# Patient Record
Sex: MALE | Race: WHITE | NOT HISPANIC OR LATINO
[De-identification: names, ages, dates, MRNs, and addresses within clinical notes are randomized per-mention and may not be internally consistent; named-entity substitution may affect disease eponyms.]

---

## 2018-07-29 PROBLEM — Z00.00 ENCOUNTER FOR PREVENTIVE HEALTH EXAMINATION: Status: ACTIVE | Noted: 2018-07-29

## 2018-08-09 ENCOUNTER — RECORD ABSTRACTING (OUTPATIENT)
Age: 68
End: 2018-08-09

## 2018-08-09 DIAGNOSIS — Z87.891 PERSONAL HISTORY OF NICOTINE DEPENDENCE: ICD-10-CM

## 2018-08-09 DIAGNOSIS — Z80.0 FAMILY HISTORY OF MALIGNANT NEOPLASM OF DIGESTIVE ORGANS: ICD-10-CM

## 2018-08-09 DIAGNOSIS — Z83.49 FAMILY HISTORY OF OTHER ENDOCRINE, NUTRITIONAL AND METABOLIC DISEASES: ICD-10-CM

## 2018-08-09 DIAGNOSIS — Z86.19 PERSONAL HISTORY OF OTHER INFECTIOUS AND PARASITIC DISEASES: ICD-10-CM

## 2018-08-09 LAB — HBA1C MFR BLD: 7.3

## 2018-08-09 RX ORDER — GLUCOSAMINE/MSM/CHONDROIT SULF 500-166.6
500 TABLET ORAL DAILY
Refills: 0 | Status: ACTIVE | COMMUNITY

## 2018-08-09 RX ORDER — MULTIVIT-MIN/FOLIC/VIT K/LYCOP 400-300MCG
25 MCG TABLET ORAL DAILY
Refills: 0 | Status: ACTIVE | COMMUNITY

## 2018-08-09 RX ORDER — LANCETS 33 GAUGE
EACH MISCELLANEOUS
Refills: 0 | Status: ACTIVE | COMMUNITY

## 2018-08-09 RX ORDER — ALLOPURINOL 300 MG/1
300 TABLET ORAL DAILY
Refills: 0 | Status: ACTIVE | COMMUNITY

## 2018-08-28 ENCOUNTER — APPOINTMENT (OUTPATIENT)
Dept: ENDOCRINOLOGY | Facility: CLINIC | Age: 68
End: 2018-08-28
Payer: COMMERCIAL

## 2018-08-28 VITALS
DIASTOLIC BLOOD PRESSURE: 60 MMHG | HEART RATE: 80 BPM | SYSTOLIC BLOOD PRESSURE: 130 MMHG | BODY MASS INDEX: 45.1 KG/M2 | WEIGHT: 315 LBS | HEIGHT: 70 IN

## 2018-08-28 DIAGNOSIS — Z86.39 PERSONAL HISTORY OF OTHER ENDOCRINE, NUTRITIONAL AND METABOLIC DISEASE: ICD-10-CM

## 2018-08-28 DIAGNOSIS — I70.209 UNSPECIFIED ATHEROSCLEROSIS OF NATIVE ARTERIES OF EXTREMITIES, UNSPECIFIED EXTREMITY: ICD-10-CM

## 2018-08-28 DIAGNOSIS — Z87.828 PERSONAL HISTORY OF OTHER (HEALED) PHYSICAL INJURY AND TRAUMA: ICD-10-CM

## 2018-08-28 DIAGNOSIS — G47.30 SLEEP APNEA, UNSPECIFIED: ICD-10-CM

## 2018-08-28 DIAGNOSIS — Z85.3 PERSONAL HISTORY OF MALIGNANT NEOPLASM OF BREAST: ICD-10-CM

## 2018-08-28 DIAGNOSIS — Z85.46 PERSONAL HISTORY OF MALIGNANT NEOPLASM OF PROSTATE: ICD-10-CM

## 2018-08-28 DIAGNOSIS — Z85.9 PERSONAL HISTORY OF MALIGNANT NEOPLASM, UNSPECIFIED: ICD-10-CM

## 2018-08-28 DIAGNOSIS — L98.499 UNSPECIFIED ATHEROSCLEROSIS OF NATIVE ARTERIES OF EXTREMITIES, UNSPECIFIED EXTREMITY: ICD-10-CM

## 2018-08-28 LAB — GLUCOSE BLDC GLUCOMTR-MCNC: 218

## 2018-08-28 PROCEDURE — 82962 GLUCOSE BLOOD TEST: CPT

## 2018-08-28 PROCEDURE — 99215 OFFICE O/P EST HI 40 MIN: CPT | Mod: 25

## 2018-08-28 RX ORDER — POTASSIUM CHLORIDE 1500 MG/1
20 TABLET, EXTENDED RELEASE ORAL DAILY
Refills: 0 | Status: DISCONTINUED | COMMUNITY
End: 2018-08-28

## 2018-08-28 RX ORDER — INSULIN HUMAN 500 [IU]/ML
500 INJECTION, SOLUTION SUBCUTANEOUS
Refills: 0 | Status: DISCONTINUED | COMMUNITY
End: 2018-08-28

## 2018-11-05 ENCOUNTER — RX RENEWAL (OUTPATIENT)
Age: 68
End: 2018-11-05

## 2018-11-16 ENCOUNTER — APPOINTMENT (OUTPATIENT)
Dept: ENDOCRINOLOGY | Facility: CLINIC | Age: 68
End: 2018-11-16
Payer: COMMERCIAL

## 2018-11-16 PROCEDURE — G0108 DIAB MANAGE TRN  PER INDIV: CPT

## 2018-12-05 ENCOUNTER — MEDICATION RENEWAL (OUTPATIENT)
Age: 68
End: 2018-12-05

## 2019-01-21 ENCOUNTER — MEDICATION RENEWAL (OUTPATIENT)
Age: 69
End: 2019-01-21

## 2019-03-22 ENCOUNTER — RECORD ABSTRACTING (OUTPATIENT)
Age: 69
End: 2019-03-22

## 2019-03-22 DIAGNOSIS — Z78.9 OTHER SPECIFIED HEALTH STATUS: ICD-10-CM

## 2019-03-26 ENCOUNTER — APPOINTMENT (OUTPATIENT)
Dept: ENDOCRINOLOGY | Facility: CLINIC | Age: 69
End: 2019-03-26
Payer: COMMERCIAL

## 2019-03-26 VITALS
HEIGHT: 70 IN | DIASTOLIC BLOOD PRESSURE: 58 MMHG | WEIGHT: 315 LBS | SYSTOLIC BLOOD PRESSURE: 110 MMHG | BODY MASS INDEX: 45.1 KG/M2 | HEART RATE: 77 BPM

## 2019-03-26 DIAGNOSIS — Z95.810 PRESENCE OF AUTOMATIC (IMPLANTABLE) CARDIAC DEFIBRILLATOR: ICD-10-CM

## 2019-03-26 DIAGNOSIS — Z86.79 PERSONAL HISTORY OF OTHER DISEASES OF THE CIRCULATORY SYSTEM: ICD-10-CM

## 2019-03-26 LAB — GLUCOSE BLDC GLUCOMTR-MCNC: 92

## 2019-03-26 PROCEDURE — 82962 GLUCOSE BLOOD TEST: CPT

## 2019-03-26 PROCEDURE — 99214 OFFICE O/P EST MOD 30 MIN: CPT | Mod: 25

## 2019-03-26 RX ORDER — FLASH GLUCOSE SENSOR
KIT MISCELLANEOUS
Qty: 1 | Refills: 0 | Status: DISCONTINUED | COMMUNITY
Start: 2018-08-28 | End: 2019-03-26

## 2019-03-26 RX ORDER — FLASH GLUCOSE SENSOR
KIT MISCELLANEOUS
Qty: 24 | Refills: 0 | Status: DISCONTINUED | COMMUNITY
Start: 2018-11-16 | End: 2019-03-26

## 2019-03-26 RX ORDER — FLASH GLUCOSE SENSOR
KIT MISCELLANEOUS
Qty: 3 | Refills: 2 | Status: DISCONTINUED | COMMUNITY
Start: 2018-08-28 | End: 2019-03-26

## 2019-03-26 RX ORDER — FLASH GLUCOSE SENSOR
KIT MISCELLANEOUS
Qty: 1 | Refills: 0 | Status: DISCONTINUED | COMMUNITY
Start: 2018-11-16 | End: 2019-03-26

## 2019-03-26 RX ORDER — METOLAZONE 2.5 MG/1
2.5 TABLET ORAL DAILY
Refills: 0 | Status: DISCONTINUED | COMMUNITY
End: 2019-03-26

## 2019-04-08 ENCOUNTER — MEDICATION RENEWAL (OUTPATIENT)
Age: 69
End: 2019-04-08

## 2019-09-03 ENCOUNTER — MEDICATION RENEWAL (OUTPATIENT)
Age: 69
End: 2019-09-03

## 2019-09-18 ENCOUNTER — MEDICATION RENEWAL (OUTPATIENT)
Age: 69
End: 2019-09-18

## 2019-09-23 ENCOUNTER — APPOINTMENT (OUTPATIENT)
Dept: ENDOCRINOLOGY | Facility: CLINIC | Age: 69
End: 2019-09-23
Payer: COMMERCIAL

## 2019-09-23 LAB
HBA1C MFR BLD HPLC: 7
LDLC SERPL DIRECT ASSAY-MCNC: 62
MICROALBUMIN/CREAT 24H UR-RTO: 3

## 2019-09-24 ENCOUNTER — INPATIENT (INPATIENT)
Facility: HOSPITAL | Age: 69
LOS: 5 days | Discharge: HOME | DRG: 871 | End: 2019-09-30
Attending: INTERNAL MEDICINE | Admitting: INTERNAL MEDICINE
Payer: COMMERCIAL

## 2019-09-24 VITALS
HEIGHT: 67 IN | DIASTOLIC BLOOD PRESSURE: 51 MMHG | RESPIRATION RATE: 20 BRPM | HEART RATE: 120 BPM | WEIGHT: 315 LBS | TEMPERATURE: 103 F | SYSTOLIC BLOOD PRESSURE: 99 MMHG | OXYGEN SATURATION: 97 %

## 2019-09-24 DIAGNOSIS — A41.9 SEPSIS, UNSPECIFIED ORGANISM: ICD-10-CM

## 2019-09-24 LAB
ALBUMIN SERPL ELPH-MCNC: 3.7 G/DL — SIGNIFICANT CHANGE UP (ref 3.3–5.2)
ALP SERPL-CCNC: 69 U/L — SIGNIFICANT CHANGE UP (ref 40–120)
ALT FLD-CCNC: 20 U/L — SIGNIFICANT CHANGE UP
ANION GAP SERPL CALC-SCNC: 18 MMOL/L — HIGH (ref 5–17)
ANISOCYTOSIS BLD QL: SLIGHT — SIGNIFICANT CHANGE UP
APPEARANCE UR: CLEAR — SIGNIFICANT CHANGE UP
APTT BLD: 26.3 SEC — LOW (ref 27.5–36.3)
AST SERPL-CCNC: 27 U/L — SIGNIFICANT CHANGE UP
BACTERIA # UR AUTO: ABNORMAL
BASOPHILS # BLD AUTO: 0 K/UL — SIGNIFICANT CHANGE UP (ref 0–0.2)
BASOPHILS NFR BLD AUTO: 0 % — SIGNIFICANT CHANGE UP (ref 0–2)
BILIRUB SERPL-MCNC: 0.7 MG/DL — SIGNIFICANT CHANGE UP (ref 0.4–2)
BILIRUB UR-MCNC: NEGATIVE — SIGNIFICANT CHANGE UP
BUN SERPL-MCNC: 40 MG/DL — HIGH (ref 8–20)
CALCIUM SERPL-MCNC: 9 MG/DL — SIGNIFICANT CHANGE UP (ref 8.6–10.2)
CHLORIDE SERPL-SCNC: 93 MMOL/L — LOW (ref 98–107)
CO2 SERPL-SCNC: 21 MMOL/L — LOW (ref 22–29)
COLOR SPEC: YELLOW — SIGNIFICANT CHANGE UP
CREAT SERPL-MCNC: 2.03 MG/DL — HIGH (ref 0.5–1.3)
DIFF PNL FLD: NEGATIVE — SIGNIFICANT CHANGE UP
ELLIPTOCYTES BLD QL SMEAR: SLIGHT — SIGNIFICANT CHANGE UP
EOSINOPHIL # BLD AUTO: 0 K/UL — SIGNIFICANT CHANGE UP (ref 0–0.5)
EOSINOPHIL NFR BLD AUTO: 0 % — SIGNIFICANT CHANGE UP (ref 0–6)
EPI CELLS # UR: SIGNIFICANT CHANGE UP
GIANT PLATELETS BLD QL SMEAR: PRESENT — SIGNIFICANT CHANGE UP
GLUCOSE SERPL-MCNC: 253 MG/DL — HIGH (ref 70–115)
GLUCOSE UR QL: 1000 MG/DL
HCT VFR BLD CALC: 37.6 % — LOW (ref 39–50)
HGB BLD-MCNC: 12 G/DL — LOW (ref 13–17)
HYALINE CASTS # UR AUTO: ABNORMAL /LPF
INR BLD: 1.23 RATIO — HIGH (ref 0.88–1.16)
KETONES UR-MCNC: NEGATIVE — SIGNIFICANT CHANGE UP
LACTATE BLDV-MCNC: 5.3 MMOL/L — CRITICAL HIGH (ref 0.5–2)
LEUKOCYTE ESTERASE UR-ACNC: NEGATIVE — SIGNIFICANT CHANGE UP
LYMPHOCYTES # BLD AUTO: 0.26 K/UL — LOW (ref 1–3.3)
LYMPHOCYTES # BLD AUTO: 1.7 % — LOW (ref 13–44)
MACROCYTES BLD QL: SIGNIFICANT CHANGE UP
MANUAL SMEAR VERIFICATION: SIGNIFICANT CHANGE UP
MCHC RBC-ENTMCNC: 31.9 GM/DL — LOW (ref 32–36)
MCHC RBC-ENTMCNC: 32.1 PG — SIGNIFICANT CHANGE UP (ref 27–34)
MCV RBC AUTO: 100.5 FL — HIGH (ref 80–100)
MONOCYTES # BLD AUTO: 0.66 K/UL — SIGNIFICANT CHANGE UP (ref 0–0.9)
MONOCYTES NFR BLD AUTO: 4.3 % — SIGNIFICANT CHANGE UP (ref 2–14)
NEUTROPHILS # BLD AUTO: 14.21 K/UL — HIGH (ref 1.8–7.4)
NEUTROPHILS NFR BLD AUTO: 72.2 % — SIGNIFICANT CHANGE UP (ref 43–77)
NEUTS BAND # BLD: 20.9 % — HIGH (ref 0–8)
NITRITE UR-MCNC: NEGATIVE — SIGNIFICANT CHANGE UP
NT-PROBNP SERPL-SCNC: 842 PG/ML — HIGH (ref 0–300)
OVALOCYTES BLD QL SMEAR: SLIGHT — SIGNIFICANT CHANGE UP
PH UR: 6 — SIGNIFICANT CHANGE UP (ref 5–8)
PLAT MORPH BLD: NORMAL — SIGNIFICANT CHANGE UP
PLATELET # BLD AUTO: 207 K/UL — SIGNIFICANT CHANGE UP (ref 150–400)
POIKILOCYTOSIS BLD QL AUTO: SLIGHT — SIGNIFICANT CHANGE UP
POLYCHROMASIA BLD QL SMEAR: SLIGHT — SIGNIFICANT CHANGE UP
POTASSIUM SERPL-MCNC: 5.3 MMOL/L — SIGNIFICANT CHANGE UP (ref 3.5–5.3)
POTASSIUM SERPL-SCNC: 5.3 MMOL/L — SIGNIFICANT CHANGE UP (ref 3.5–5.3)
PROT SERPL-MCNC: 7.3 G/DL — SIGNIFICANT CHANGE UP (ref 6.6–8.7)
PROT UR-MCNC: 15 MG/DL
PROTHROM AB SERPL-ACNC: 14.2 SEC — HIGH (ref 10–12.9)
RBC # BLD: 3.74 M/UL — LOW (ref 4.2–5.8)
RBC # FLD: 14.9 % — HIGH (ref 10.3–14.5)
RBC BLD AUTO: ABNORMAL
RBC CASTS # UR COMP ASSIST: SIGNIFICANT CHANGE UP /HPF (ref 0–4)
SODIUM SERPL-SCNC: 132 MMOL/L — LOW (ref 135–145)
SP GR SPEC: 1.01 — SIGNIFICANT CHANGE UP (ref 1.01–1.02)
TROPONIN T SERPL-MCNC: <0.01 NG/ML — SIGNIFICANT CHANGE UP (ref 0–0.06)
UROBILINOGEN FLD QL: NEGATIVE MG/DL — SIGNIFICANT CHANGE UP
VARIANT LYMPHS # BLD: 0.9 % — SIGNIFICANT CHANGE UP (ref 0–6)
WBC # BLD: 15.26 K/UL — HIGH (ref 3.8–10.5)
WBC # FLD AUTO: 15.26 K/UL — HIGH (ref 3.8–10.5)
WBC UR QL: SIGNIFICANT CHANGE UP

## 2019-09-24 PROCEDURE — 93010 ELECTROCARDIOGRAM REPORT: CPT

## 2019-09-24 PROCEDURE — 99291 CRITICAL CARE FIRST HOUR: CPT

## 2019-09-24 PROCEDURE — 71045 X-RAY EXAM CHEST 1 VIEW: CPT | Mod: 26

## 2019-09-24 RX ORDER — VANCOMYCIN HCL 1 G
1000 VIAL (EA) INTRAVENOUS EVERY 12 HOURS
Refills: 0 | Status: DISCONTINUED | OUTPATIENT
Start: 2019-09-24 | End: 2019-09-24

## 2019-09-24 RX ORDER — LEVOTHYROXINE SODIUM 125 MCG
75 TABLET ORAL DAILY
Refills: 0 | Status: DISCONTINUED | OUTPATIENT
Start: 2019-09-24 | End: 2019-09-30

## 2019-09-24 RX ORDER — SODIUM CHLORIDE 9 MG/ML
1000 INJECTION, SOLUTION INTRAVENOUS
Refills: 0 | Status: DISCONTINUED | OUTPATIENT
Start: 2019-09-24 | End: 2019-09-30

## 2019-09-24 RX ORDER — TAMOXIFEN CITRATE 20 MG/1
20 TABLET, FILM COATED ORAL DAILY
Refills: 0 | Status: DISCONTINUED | OUTPATIENT
Start: 2019-09-24 | End: 2019-09-30

## 2019-09-24 RX ORDER — HYDROCORTISONE 20 MG
50 TABLET ORAL EVERY 6 HOURS
Refills: 0 | Status: DISCONTINUED | OUTPATIENT
Start: 2019-09-24 | End: 2019-09-25

## 2019-09-24 RX ORDER — TAMOXIFEN CITRATE 20 MG/1
1 TABLET, FILM COATED ORAL
Qty: 0 | Refills: 0 | DISCHARGE

## 2019-09-24 RX ORDER — HEPARIN SODIUM 5000 [USP'U]/ML
5000 INJECTION INTRAVENOUS; SUBCUTANEOUS EVERY 8 HOURS
Refills: 0 | Status: DISCONTINUED | OUTPATIENT
Start: 2019-09-24 | End: 2019-09-25

## 2019-09-24 RX ORDER — NOREPINEPHRINE BITARTRATE/D5W 8 MG/250ML
0.05 PLASTIC BAG, INJECTION (ML) INTRAVENOUS
Qty: 8 | Refills: 0 | Status: DISCONTINUED | OUTPATIENT
Start: 2019-09-24 | End: 2019-09-25

## 2019-09-24 RX ORDER — CHLORHEXIDINE GLUCONATE 213 G/1000ML
1 SOLUTION TOPICAL
Refills: 0 | Status: DISCONTINUED | OUTPATIENT
Start: 2019-09-24 | End: 2019-09-26

## 2019-09-24 RX ORDER — ASPIRIN/CALCIUM CARB/MAGNESIUM 324 MG
1 TABLET ORAL
Qty: 0 | Refills: 0 | DISCHARGE

## 2019-09-24 RX ORDER — ASPIRIN/CALCIUM CARB/MAGNESIUM 324 MG
81 TABLET ORAL DAILY
Refills: 0 | Status: DISCONTINUED | OUTPATIENT
Start: 2019-09-24 | End: 2019-09-30

## 2019-09-24 RX ORDER — DEXTROSE 50 % IN WATER 50 %
15 SYRINGE (ML) INTRAVENOUS ONCE
Refills: 0 | Status: DISCONTINUED | OUTPATIENT
Start: 2019-09-24 | End: 2019-09-30

## 2019-09-24 RX ORDER — ATORVASTATIN CALCIUM 80 MG/1
40 TABLET, FILM COATED ORAL AT BEDTIME
Refills: 0 | Status: DISCONTINUED | OUTPATIENT
Start: 2019-09-24 | End: 2019-09-24

## 2019-09-24 RX ORDER — METFORMIN HYDROCHLORIDE 850 MG/1
1 TABLET ORAL
Qty: 0 | Refills: 0 | DISCHARGE

## 2019-09-24 RX ORDER — ATORVASTATIN CALCIUM 80 MG/1
1 TABLET, FILM COATED ORAL
Qty: 0 | Refills: 0 | DISCHARGE

## 2019-09-24 RX ORDER — DEXTROSE 50 % IN WATER 50 %
25 SYRINGE (ML) INTRAVENOUS ONCE
Refills: 0 | Status: DISCONTINUED | OUTPATIENT
Start: 2019-09-24 | End: 2019-09-30

## 2019-09-24 RX ORDER — ACETAMINOPHEN 500 MG
975 TABLET ORAL ONCE
Refills: 0 | Status: COMPLETED | OUTPATIENT
Start: 2019-09-24 | End: 2019-09-24

## 2019-09-24 RX ORDER — GLUCAGON INJECTION, SOLUTION 0.5 MG/.1ML
1 INJECTION, SOLUTION SUBCUTANEOUS ONCE
Refills: 0 | Status: DISCONTINUED | OUTPATIENT
Start: 2019-09-24 | End: 2019-09-30

## 2019-09-24 RX ORDER — EMPAGLIFLOZIN 10 MG/1
1 TABLET, FILM COATED ORAL
Qty: 0 | Refills: 0 | DISCHARGE

## 2019-09-24 RX ORDER — VANCOMYCIN HCL 1 G
1000 VIAL (EA) INTRAVENOUS ONCE
Refills: 0 | Status: COMPLETED | OUTPATIENT
Start: 2019-09-24 | End: 2019-09-24

## 2019-09-24 RX ORDER — DEXTROSE 50 % IN WATER 50 %
12.5 SYRINGE (ML) INTRAVENOUS ONCE
Refills: 0 | Status: DISCONTINUED | OUTPATIENT
Start: 2019-09-24 | End: 2019-09-30

## 2019-09-24 RX ORDER — PIPERACILLIN AND TAZOBACTAM 4; .5 G/20ML; G/20ML
3.38 INJECTION, POWDER, LYOPHILIZED, FOR SOLUTION INTRAVENOUS ONCE
Refills: 0 | Status: COMPLETED | OUTPATIENT
Start: 2019-09-24 | End: 2019-09-24

## 2019-09-24 RX ORDER — SODIUM CHLORIDE 9 MG/ML
2200 INJECTION INTRAMUSCULAR; INTRAVENOUS; SUBCUTANEOUS ONCE
Refills: 0 | Status: COMPLETED | OUTPATIENT
Start: 2019-09-24 | End: 2019-09-24

## 2019-09-24 RX ORDER — VANCOMYCIN HCL 1 G
1500 VIAL (EA) INTRAVENOUS EVERY 24 HOURS
Refills: 0 | Status: DISCONTINUED | OUTPATIENT
Start: 2019-09-24 | End: 2019-09-26

## 2019-09-24 RX ORDER — PREGABALIN 225 MG/1
1 CAPSULE ORAL
Qty: 0 | Refills: 0 | DISCHARGE

## 2019-09-24 RX ORDER — SODIUM CHLORIDE 9 MG/ML
1000 INJECTION, SOLUTION INTRAVENOUS ONCE
Refills: 0 | Status: COMPLETED | OUTPATIENT
Start: 2019-09-24 | End: 2019-09-24

## 2019-09-24 RX ORDER — SITAGLIPTIN 50 MG/1
1 TABLET, FILM COATED ORAL
Qty: 0 | Refills: 0 | DISCHARGE

## 2019-09-24 RX ORDER — PIPERACILLIN AND TAZOBACTAM 4; .5 G/20ML; G/20ML
3.38 INJECTION, POWDER, LYOPHILIZED, FOR SOLUTION INTRAVENOUS EVERY 8 HOURS
Refills: 0 | Status: DISCONTINUED | OUTPATIENT
Start: 2019-09-24 | End: 2019-09-25

## 2019-09-24 RX ORDER — INSULIN LISPRO 100/ML
VIAL (ML) SUBCUTANEOUS EVERY 6 HOURS
Refills: 0 | Status: DISCONTINUED | OUTPATIENT
Start: 2019-09-24 | End: 2019-09-25

## 2019-09-24 RX ADMIN — Medication 15.41 MICROGRAM(S)/KG/MIN: at 20:14

## 2019-09-24 RX ADMIN — PIPERACILLIN AND TAZOBACTAM 3.38 GRAM(S): 4; .5 INJECTION, POWDER, LYOPHILIZED, FOR SOLUTION INTRAVENOUS at 18:00

## 2019-09-24 RX ADMIN — SODIUM CHLORIDE 2200 MILLILITER(S): 9 INJECTION INTRAMUSCULAR; INTRAVENOUS; SUBCUTANEOUS at 17:22

## 2019-09-24 RX ADMIN — PIPERACILLIN AND TAZOBACTAM 200 GRAM(S): 4; .5 INJECTION, POWDER, LYOPHILIZED, FOR SOLUTION INTRAVENOUS at 17:22

## 2019-09-24 RX ADMIN — SODIUM CHLORIDE 2000 MILLILITER(S): 9 INJECTION, SOLUTION INTRAVENOUS at 19:06

## 2019-09-24 RX ADMIN — Medication 250 MILLIGRAM(S): at 19:13

## 2019-09-24 RX ADMIN — Medication 975 MILLIGRAM(S): at 17:21

## 2019-09-24 RX ADMIN — SODIUM CHLORIDE 1000 MILLILITER(S): 9 INJECTION, SOLUTION INTRAVENOUS at 21:00

## 2019-09-24 RX ADMIN — SODIUM CHLORIDE 2200 MILLILITER(S): 9 INJECTION INTRAMUSCULAR; INTRAVENOUS; SUBCUTANEOUS at 19:00

## 2019-09-24 RX ADMIN — Medication 975 MILLIGRAM(S): at 17:51

## 2019-09-24 RX ADMIN — Medication 1000 MILLIGRAM(S): at 20:14

## 2019-09-24 NOTE — ED ADULT NURSE NOTE - NSIMPLEMENTINTERV_GEN_ALL_ED
Implemented All Fall Risk Interventions:  [place] to call system. Call bell, personal items and telephone within reach. Instruct patient to call for assistance. Room bathroom lighting operational. Non-slip footwear when patient is off stretcher. Physically safe environment: no spills, clutter or unnecessary equipment. Stretcher in lowest position, wheels locked, appropriate side rails in place. Provide visual cue, wrist band, yellow gown, etc. Monitor gait and stability. Monitor for mental status changes and reorient to person, place, and time. Review medications for side effects contributing to fall risk. Reinforce activity limits and safety measures with patient and family.

## 2019-09-24 NOTE — ED PROVIDER NOTE - PROGRESS NOTE DETAILS
received sign out dr dozier pt showing globa weakness no lateralzing deficits septic non defined source will dose fluid with chf histroy Ideal body weight if still hypotensive after third liter will start levo rush POCUS showing no pericardia effusion unable to visualize ivc 2/2 body habitus

## 2019-09-24 NOTE — ED ADULT NURSE NOTE - CHPI ED NUR SYMPTOMS NEG
no vomiting/no fever/no chills/no rash/no shortness of breath/no headache/no abdominal pain/no diarrhea

## 2019-09-24 NOTE — ED ADULT NURSE NOTE - NS ED NOTE  TALK SOMEONE YN
Technician: Josefa Pratt RRT   Good patient effort & cooperation.  The results of this test meet the ATS/ERS standards for acceptability & reproducibility.  Test was performed on the VYou Body Plethysmograph-Elite DX system.  Predicted values were [facility]-3 for spirometry, [facility] for DLCO, ITS for Lung Volumes.  The DLCO was uncorrected for Hgb.  A bronchodilator of Ventolin HFA -2puffs via spacer administered.  DLCO performed during dilation period.    Interpretation;     Spirometry showed FVC of 3.09 L, 70% of predicted.  FEV1 was 1.91 L, 57% of predicted.  FEV1/FVC ratio was 62%.  There was no response of bronchodilators.  Flow volume loop was consistent with obstruction.    Lung volumes showed air trapping with residual volume of 151% of predicted.  Total lung capacity was 97% of predicted.    Diffusion capacity was mildly increased 137% of predicted.    Impression:  The patient has moderate obstructive ventilatory defect consistent with a patient history of asthma/COPD.  Clinical correlation is required   No

## 2019-09-24 NOTE — H&P ADULT - NSHPPHYSICALEXAM_GEN_ALL_CORE
General: No acute distress.      HEENT: Pupils equal, reactive to light.  Symmetric.    PULM: Clear to auscultation bilaterally, no significant sputum production    NECK: Supple, no lymphadenopathy, trachea midline    CVS: Regular rate and rhythm, no murmurs, rubs, or gallops    ABD: Soft, nondistended, nontender, normoactive bowel sounds, no masses    EXT: No edema, nontender    SKIN: Warm and well perfused, no rashes noted.    NEURO: Alert, oriented, interactive, nonfocal General: No acute distress.      HEENT: Pupils equal, reactive to light.  Symmetric.    PULM: Decreased bibasilar air entry    NECK: Supple, no lymphadenopathy, trachea midline    CVS: Regular rate and rhythm, no murmurs, rubs, or gallops    ABD: Soft, obese, nontender, normoactive bowel sounds, no masses    EXT: non pitting edema, nontender, chronic skin changes    SKIN: Warm and well perfused, no rashes noted.    NEURO: Alert, oriented, interactive, nonfocal

## 2019-09-24 NOTE — ED PROVIDER NOTE - PHYSICAL EXAMINATION
Gen: Well appearing morbidly obese male in mild distress  Head: NC/AT  Neck: trachea midline  Resp:  No distress, CTAB  CV: tachycardic, regular rhythm  GI: soft, NTND, No CVA ttp.  Ext: no deformities, chronic venous stasis changes, mild edema. No midline or paraspinal ttp.   Neuro:  A&O appears non focal  Skin:  Warm and dry as visualized  Psych:  Normal affect and mood

## 2019-09-24 NOTE — ED PROVIDER NOTE - CLINICAL SUMMARY MEDICAL DECISION MAKING FREE TEXT BOX
Sepsis - undifferentiated likely UTI/pyelo  1) IV Access/IVF/LABS/UA/Cultures  2) CXR  3) IV Abx  4) Admit

## 2019-09-24 NOTE — ED PROVIDER NOTE - ATTENDING CONTRIBUTION TO CARE
Bambi: I performed a face to face bedside interview with patient regarding history of present illness, review of symptoms and past medical history. I completed an independent physical exam and ordered tests/medications as needed.  I have discussed patient's plan of care with the resident. The resident assisted in  executing the discussed plan. I was available for any questions or issues that may have arose during the execution of the plan of care.

## 2019-09-24 NOTE — ED ADULT TRIAGE NOTE - CHIEF COMPLAINT QUOTE
Patient BIBA to ED today with c/o weakness, general malaise.  Patient found to have a fever and be hypotensive.  Code Sepsis called.

## 2019-09-24 NOTE — ED ADULT NURSE NOTE - OBJECTIVE STATEMENT
Pt was found in his car c/o weakness, lethargy and "feeling hot". Pt with 102 oral temp. Pt also reports urinary frequency over the last couple of days. Pt denies any cough, SOB, back pain, headache.

## 2019-09-24 NOTE — H&P ADULT - ASSESSMENT
Neuro:   Pain Mx:  Sedation/Anxiolytic:   Daily SAT    Cardiovascular:   Aim for MAP target 65    Resp/Chest:   On Volume AC   Wean per protocol as tolerated and daily SBT  PLS  Vent bundle    Gi/Nutrition:   Diet:   Bowel regimen as needed    ID:   f/u Blood cultures. Abx:     Renal:   Avoid nephrotoxic agents. Strict I&O    Endocrinology:   Check A1c and lipid panel  Maintain Blood sugar < 180. ISS as per protocol    Haem/Oncology:  DVT ppx w/ HSQ      Critical Care time: 35 minutes assessing presenting problems of acute illness that poses high probability of life threatening deterioration or end organ damage/dysfunction.  Medical decision making including Initiating plan of care, reviewing data, reviewing radiology, discussing with multidisciplinary team, non inclusive of procedures, discussing goals of care with patient/family Shock, Distributive vs cardiogenic  ICM/ HFrEF s/p ICD  VIKTORIYA vs CKD ( no baseline SCr in file)  DM  hypothyroid  CAD  PVD  recently diagnosed MM ( f/[place] at [facility] [place] medically managed)  Prostate CA (remote)  Colon CA s/p resection  L breast CA s/p mastectomy    Neuro: No active issues. CTH requested in setting of frequent falls  Cardiovascular: Received fluid resuscitation w/ 3.5L in ED. On levophed gtt. Aim for MAP ~ 65. Hold Entresto 24/26mg, Torsemide, Coreg 6.25bid, Aldactone 25mg and statin. TTE and Cards. Trend lactate, cardiac enzymes and CK  Resp/Chest: Maintain SpO2 > 92%. Duoneb PRN   GI/Nutrition: Diet as tolerated  ID: Start empiric Abx cover w/ Zosyn and Vanco. f/u BCx, SCx and RVP. CT chest/ A/P requested. Start [place] protocol  Renal: Avoid nephrotoxic agents. Strict I&O.   Endocrinology: Check A1c and lipid panel. Maintain Blood sugar < 180. ISS as per protocol. Continue levothyroxine. Check TSH  Haem/Oncology:  DVT ppx w/ HSQ    Critical Care time: 55 minutes assessing presenting problems of acute illness that poses high probability of life threatening deterioration or end organ damage/dysfunction.  Medical decision making including Initiating plan of care, reviewing data, reviewing radiology, discussing with multidisciplinary team, non inclusive of procedures, discussing goals of care with patient/family Shock, Distributive vs cardiogenic  ICM/ HFrEF s/p ICD  VIKTORIYA vs CKD ( no baseline SCr in file)  DM  hypothyroid  CAD  PVD  recently diagnosed MM ( f/u at [facility] [place], medically managed)  Prostate CA (remote)  Colon CA s/p resection  L breast CA s/p mastectomy on tamoxifen    Neuro: No active issues. CTH requested in setting of frequent falls  Cardiovascular: Received fluid resuscitation w/ 3.5L in ED. On levophed gtt. Aim for MAP ~ 65. Hold Entresto 24/26mg, Torsemide, Coreg 6.25bid, Aldactone 25mg and statin. TTE and Cards. Trend lactate, cardiac enzymes and CK  Resp/Chest: Maintain SpO2 > 92%. Duoneb PRN   GI/Nutrition: Diet as tolerated  ID: Start empiric Abx cover w/ Zosyn and Vanco. f/u BCx, SCx and RVP. CT chest/ A/P requested. Start [place] protocol  Renal: Avoid nephrotoxic agents. Strict I&O.   Endocrinology: Check A1c and lipid panel. Maintain Blood sugar < 180. ISS as per protocol. Continue levothyroxine. Check TSH  Haem/Oncology:  DVT ppx w/ HSQ    Critical Care time: 55 minutes assessing presenting problems of acute illness that poses high probability of life threatening deterioration or end organ damage/dysfunction.  Medical decision making including Initiating plan of care, reviewing data, reviewing radiology, discussing with multidisciplinary team, non inclusive of procedures, discussing goals of care with patient/family Shock, Distributive vs cardiogenic  ICM/ HFrEF s/p BiVICD  VIKTORIYA vs CKD ( no baseline SCr in file)  DM  hypothyroid  CAD  PVD  recently diagnosed MM ( f/u at [facility] [place], medically managed)  Prostate CA (remote)  Colon CA s/p resection  L breast CA s/p mastectomy on tamoxifen    Neuro: No active issues. CTH requested in setting of frequent falls  Cardiovascular: Received fluid resuscitation w/ 3.5L in ED. On levophed gtt. Aim for MAP ~ 65. Hold Entresto 24/26mg, Torsemide, Coreg 6.25bid, Aldactone 25mg and statin. TTE and Cards. Trend lactate, cardiac enzymes and CK  Resp/Chest: Maintain SpO2 > 92%. Duoneb PRN   GI/Nutrition: Diet as tolerated  ID: Start empiric Abx cover w/ Zosyn and Vanco. f/u BCx, SCx and RVP. CT chest/ A/P requested. Start [place] protocol  Renal: Avoid nephrotoxic agents. Strict I&O.   Endocrinology: Check A1c and lipid panel. Maintain Blood sugar < 180. ISS as per protocol. Continue levothyroxine. Check TSH  Haem/Oncology:  DVT ppx w/ HSQ    Critical Care time: 55 minutes assessing presenting problems of acute illness that poses high probability of life threatening deterioration or end organ damage/dysfunction.  Medical decision making including Initiating plan of care, reviewing data, reviewing radiology, discussing with multidisciplinary team, non inclusive of procedures, discussing goals of care with patient/family

## 2019-09-24 NOTE — H&P ADULT - NSICDXPASTMEDICALHX_GEN_ALL_CORE_FT
PAST MEDICAL HISTORY:  Breast cancer     DM (diabetes mellitus)     HTN (hypertension)     Prostate cancer

## 2019-09-24 NOTE — ED PROVIDER NOTE - OBJECTIVE STATEMENT
69M h/o breast cancer s/ mastectomy on tamoxifen, prostate cancer s/p resection, HTN, DM, CHF on torsemide p/w fever and weakness. Woke up this morning feeling weak, drove granddaughter to school, was unable to exit car after because he felt too weak. Endorses frequent urination for last couple of days. + L low back pain for last 2 weeks after a fall. Pt febrile and tachycardic Code sepsis called in triage. Denies cough, hematuria, rash, LOC.

## 2019-09-24 NOTE — ED ADULT NURSE REASSESSMENT NOTE - NS ED NURSE REASSESS COMMENT FT1
MD Renteria at bedside preforming bedside sono and aware pf BP, pt A&Ox4, resp even and unlabored, pt on cardiac monitor , will continue to monitor

## 2019-09-24 NOTE — H&P ADULT - NSHPREVIEWOFSYSTEMS_GEN_ALL_CORE
CONSTITUTIONAL: No fever, chills, +ve fatigue  EYES: No eye pain, visual disturbances, or discharge  ENMT:  No difficulty hearing, tinnitus, vertigo; No sinus or throat pain  NECK: No pain or stiffness  RESPIRATORY: No cough, wheezing, chills or hemoptysis; No shortness of breath  CARDIOVASCULAR: No chest pain, palpitations, dizziness, or leg swelling  GASTROINTESTINAL: No abdominal or epigastric pain. No nausea, vomiting, or hematemesis; No diarrhea or constipation. No melena or hematochezia.  GENITOURINARY: No dysuria, frequency, hematuria, or incontinence  NEUROLOGICAL: No headaches, memory loss, loss of strength, numbness, or tremors  SKIN: No itching, burning, rashes, or lesions   MUSCULOSKELETAL: No joint pain or swelling; No muscle, back, or extremity pain. +ve Back pain   PSYCHIATRIC: No depression, anxiety, mood swings, or difficulty sleeping

## 2019-09-25 LAB
ANION GAP SERPL CALC-SCNC: 15 MMOL/L — SIGNIFICANT CHANGE UP (ref 5–17)
BUN SERPL-MCNC: 41 MG/DL — HIGH (ref 8–20)
CALCIUM SERPL-MCNC: 8.5 MG/DL — LOW (ref 8.6–10.2)
CHLORIDE SERPL-SCNC: 96 MMOL/L — LOW (ref 98–107)
CK MB CFR SERPL CALC: 17.4 NG/ML — HIGH (ref 0–6.7)
CK SERPL-CCNC: 4786 U/L — HIGH (ref 30–200)
CO2 SERPL-SCNC: 22 MMOL/L — SIGNIFICANT CHANGE UP (ref 22–29)
CORTIS AM PEAK SERPL-MCNC: 21.7 UG/DL — HIGH (ref 6–18.4)
CREAT SERPL-MCNC: 1.84 MG/DL — HIGH (ref 0.5–1.3)
CULTURE RESULTS: NO GROWTH — SIGNIFICANT CHANGE UP
GLUCOSE BLDC GLUCOMTR-MCNC: 166 MG/DL — HIGH (ref 70–99)
GLUCOSE BLDC GLUCOMTR-MCNC: 189 MG/DL — HIGH (ref 70–99)
GLUCOSE BLDC GLUCOMTR-MCNC: 201 MG/DL — HIGH (ref 70–99)
GLUCOSE BLDC GLUCOMTR-MCNC: 202 MG/DL — HIGH (ref 70–99)
GLUCOSE BLDC GLUCOMTR-MCNC: 211 MG/DL — HIGH (ref 70–99)
GLUCOSE BLDC GLUCOMTR-MCNC: 211 MG/DL — HIGH (ref 70–99)
GLUCOSE BLDC GLUCOMTR-MCNC: 215 MG/DL — HIGH (ref 70–99)
GLUCOSE BLDC GLUCOMTR-MCNC: 219 MG/DL — HIGH (ref 70–99)
GLUCOSE BLDC GLUCOMTR-MCNC: 220 MG/DL — HIGH (ref 70–99)
GLUCOSE BLDC GLUCOMTR-MCNC: 224 MG/DL — HIGH (ref 70–99)
GLUCOSE BLDC GLUCOMTR-MCNC: 225 MG/DL — HIGH (ref 70–99)
GLUCOSE BLDC GLUCOMTR-MCNC: 229 MG/DL — HIGH (ref 70–99)
GLUCOSE BLDC GLUCOMTR-MCNC: 244 MG/DL — HIGH (ref 70–99)
GLUCOSE SERPL-MCNC: 245 MG/DL — HIGH (ref 70–115)
HBA1C BLD-MCNC: 6.8 % — HIGH (ref 4–5.6)
HCT VFR BLD CALC: 33.8 % — LOW (ref 39–50)
HCV AB S/CO SERPL IA: 4.22 S/CO — HIGH (ref 0–0.99)
HCV AB SERPL-IMP: ABNORMAL
HGB BLD-MCNC: 11.1 G/DL — LOW (ref 13–17)
LACTATE BLDV-MCNC: 4.1 MMOL/L — CRITICAL HIGH (ref 0.5–2)
LACTATE SERPL-SCNC: 3.7 MMOL/L — HIGH (ref 0.5–2)
MAGNESIUM SERPL-MCNC: 1.6 MG/DL — SIGNIFICANT CHANGE UP (ref 1.6–2.6)
MCHC RBC-ENTMCNC: 32.6 PG — SIGNIFICANT CHANGE UP (ref 27–34)
MCHC RBC-ENTMCNC: 32.8 GM/DL — SIGNIFICANT CHANGE UP (ref 32–36)
MCV RBC AUTO: 99.1 FL — SIGNIFICANT CHANGE UP (ref 80–100)
PLATELET # BLD AUTO: 208 K/UL — SIGNIFICANT CHANGE UP (ref 150–400)
POTASSIUM SERPL-MCNC: 5.5 MMOL/L — HIGH (ref 3.5–5.3)
POTASSIUM SERPL-SCNC: 5.5 MMOL/L — HIGH (ref 3.5–5.3)
PROCALCITONIN SERPL-MCNC: 31.64 NG/ML — HIGH (ref 0.02–0.1)
RAPID RVP RESULT: SIGNIFICANT CHANGE UP
RBC # BLD: 3.41 M/UL — LOW (ref 4.2–5.8)
RBC # FLD: 15.3 % — HIGH (ref 10.3–14.5)
SODIUM SERPL-SCNC: 133 MMOL/L — LOW (ref 135–145)
SPECIMEN SOURCE: SIGNIFICANT CHANGE UP
TSH SERPL-MCNC: 1.35 UIU/ML — SIGNIFICANT CHANGE UP (ref 0.27–4.2)
WBC # BLD: 28.47 K/UL — HIGH (ref 3.8–10.5)
WBC # FLD AUTO: 28.47 K/UL — HIGH (ref 3.8–10.5)

## 2019-09-25 PROCEDURE — 93970 EXTREMITY STUDY: CPT | Mod: 26

## 2019-09-25 PROCEDURE — 70450 CT HEAD/BRAIN W/O DYE: CPT | Mod: 26

## 2019-09-25 PROCEDURE — 74176 CT ABD & PELVIS W/O CONTRAST: CPT | Mod: 26

## 2019-09-25 PROCEDURE — 71250 CT THORAX DX C-: CPT | Mod: 26

## 2019-09-25 PROCEDURE — 99291 CRITICAL CARE FIRST HOUR: CPT

## 2019-09-25 RX ORDER — NYSTATIN CREAM 100000 [USP'U]/G
1 CREAM TOPICAL
Refills: 0 | Status: DISCONTINUED | OUTPATIENT
Start: 2019-09-25 | End: 2019-09-30

## 2019-09-25 RX ORDER — ATORVASTATIN CALCIUM 80 MG/1
40 TABLET, FILM COATED ORAL AT BEDTIME
Refills: 0 | Status: DISCONTINUED | OUTPATIENT
Start: 2019-09-25 | End: 2019-09-25

## 2019-09-25 RX ORDER — INSULIN HUMAN 100 [IU]/ML
4 INJECTION, SOLUTION SUBCUTANEOUS
Qty: 50 | Refills: 0 | Status: DISCONTINUED | OUTPATIENT
Start: 2019-09-25 | End: 2019-09-26

## 2019-09-25 RX ORDER — ASCORBIC ACID 60 MG
1500 TABLET,CHEWABLE ORAL EVERY 6 HOURS
Refills: 0 | Status: DISCONTINUED | OUTPATIENT
Start: 2019-09-25 | End: 2019-09-25

## 2019-09-25 RX ORDER — SODIUM CHLORIDE 9 MG/ML
1000 INJECTION, SOLUTION INTRAVENOUS
Refills: 0 | Status: DISCONTINUED | OUTPATIENT
Start: 2019-09-25 | End: 2019-09-25

## 2019-09-25 RX ORDER — MIDODRINE HYDROCHLORIDE 2.5 MG/1
5 TABLET ORAL EVERY 8 HOURS
Refills: 0 | Status: DISCONTINUED | OUTPATIENT
Start: 2019-09-25 | End: 2019-09-30

## 2019-09-25 RX ORDER — CHOLECALCIFEROL (VITAMIN D3) 125 MCG
1000 CAPSULE ORAL DAILY
Refills: 0 | Status: DISCONTINUED | OUTPATIENT
Start: 2019-09-25 | End: 2019-09-30

## 2019-09-25 RX ORDER — HEPARIN SODIUM 5000 [USP'U]/ML
7500 INJECTION INTRAVENOUS; SUBCUTANEOUS EVERY 8 HOURS
Refills: 0 | Status: DISCONTINUED | OUTPATIENT
Start: 2019-09-25 | End: 2019-09-30

## 2019-09-25 RX ORDER — ACETAMINOPHEN 500 MG
650 TABLET ORAL EVERY 6 HOURS
Refills: 0 | Status: DISCONTINUED | OUTPATIENT
Start: 2019-09-25 | End: 2019-09-30

## 2019-09-25 RX ORDER — PREGABALIN 225 MG/1
250 CAPSULE ORAL DAILY
Refills: 0 | Status: DISCONTINUED | OUTPATIENT
Start: 2019-09-25 | End: 2019-09-25

## 2019-09-25 RX ORDER — MAGNESIUM SULFATE 500 MG/ML
2 VIAL (ML) INJECTION ONCE
Refills: 0 | Status: COMPLETED | OUTPATIENT
Start: 2019-09-25 | End: 2019-09-25

## 2019-09-25 RX ORDER — THIAMINE MONONITRATE (VIT B1) 100 MG
200 TABLET ORAL
Refills: 0 | Status: DISCONTINUED | OUTPATIENT
Start: 2019-09-25 | End: 2019-09-25

## 2019-09-25 RX ORDER — TAMSULOSIN HYDROCHLORIDE 0.4 MG/1
0.4 CAPSULE ORAL AT BEDTIME
Refills: 0 | Status: DISCONTINUED | OUTPATIENT
Start: 2019-09-25 | End: 2019-09-30

## 2019-09-25 RX ORDER — CEFEPIME 1 G/1
2000 INJECTION, POWDER, FOR SOLUTION INTRAMUSCULAR; INTRAVENOUS
Refills: 0 | Status: DISCONTINUED | OUTPATIENT
Start: 2019-09-25 | End: 2019-09-29

## 2019-09-25 RX ORDER — INFLUENZA VIRUS VACCINE 15; 15; 15; 15 UG/.5ML; UG/.5ML; UG/.5ML; UG/.5ML
0.5 SUSPENSION INTRAMUSCULAR ONCE
Refills: 0 | Status: DISCONTINUED | OUTPATIENT
Start: 2019-09-25 | End: 2019-09-30

## 2019-09-25 RX ORDER — INSULIN LISPRO 100/ML
4 VIAL (ML) SUBCUTANEOUS ONCE
Refills: 0 | Status: COMPLETED | OUTPATIENT
Start: 2019-09-25 | End: 2019-09-25

## 2019-09-25 RX ORDER — CEFEPIME 1 G/1
2000 INJECTION, POWDER, FOR SOLUTION INTRAMUSCULAR; INTRAVENOUS ONCE
Refills: 0 | Status: COMPLETED | OUTPATIENT
Start: 2019-09-25 | End: 2019-09-25

## 2019-09-25 RX ORDER — INSULIN LISPRO 100/ML
VIAL (ML) SUBCUTANEOUS
Refills: 0 | Status: DISCONTINUED | OUTPATIENT
Start: 2019-09-25 | End: 2019-09-25

## 2019-09-25 RX ADMIN — PIPERACILLIN AND TAZOBACTAM 25 GRAM(S): 4; .5 INJECTION, POWDER, LYOPHILIZED, FOR SOLUTION INTRAVENOUS at 06:11

## 2019-09-25 RX ADMIN — Medication 153 MILLIGRAM(S): at 17:59

## 2019-09-25 RX ADMIN — INSULIN HUMAN 4 UNIT(S)/HR: 100 INJECTION, SOLUTION SUBCUTANEOUS at 17:17

## 2019-09-25 RX ADMIN — HEPARIN SODIUM 5000 UNIT(S): 5000 INJECTION INTRAVENOUS; SUBCUTANEOUS at 06:31

## 2019-09-25 RX ADMIN — HEPARIN SODIUM 7500 UNIT(S): 5000 INJECTION INTRAVENOUS; SUBCUTANEOUS at 14:15

## 2019-09-25 RX ADMIN — TAMSULOSIN HYDROCHLORIDE 0.4 MILLIGRAM(S): 0.4 CAPSULE ORAL at 21:17

## 2019-09-25 RX ADMIN — Medication 4 UNIT(S): at 02:05

## 2019-09-25 RX ADMIN — CHLORHEXIDINE GLUCONATE 1 APPLICATION(S): 213 SOLUTION TOPICAL at 06:08

## 2019-09-25 RX ADMIN — Medication 50 MILLIGRAM(S): at 06:31

## 2019-09-25 RX ADMIN — Medication 650 MILLIGRAM(S): at 01:39

## 2019-09-25 RX ADMIN — NYSTATIN CREAM 1 APPLICATION(S): 100000 CREAM TOPICAL at 06:11

## 2019-09-25 RX ADMIN — CEFEPIME 100 MILLIGRAM(S): 1 INJECTION, POWDER, FOR SOLUTION INTRAMUSCULAR; INTRAVENOUS at 21:15

## 2019-09-25 RX ADMIN — Medication 75 MICROGRAM(S): at 06:31

## 2019-09-25 RX ADMIN — MIDODRINE HYDROCHLORIDE 5 MILLIGRAM(S): 2.5 TABLET ORAL at 14:14

## 2019-09-25 RX ADMIN — HEPARIN SODIUM 7500 UNIT(S): 5000 INJECTION INTRAVENOUS; SUBCUTANEOUS at 21:16

## 2019-09-25 RX ADMIN — Medication 300 MILLIGRAM(S): at 03:43

## 2019-09-25 RX ADMIN — Medication 6: at 06:31

## 2019-09-25 RX ADMIN — Medication 50 GRAM(S): at 07:52

## 2019-09-25 RX ADMIN — Medication 102 MILLIGRAM(S): at 17:52

## 2019-09-25 RX ADMIN — MIDODRINE HYDROCHLORIDE 5 MILLIGRAM(S): 2.5 TABLET ORAL at 21:16

## 2019-09-25 RX ADMIN — Medication 650 MILLIGRAM(S): at 22:25

## 2019-09-25 RX ADMIN — Medication 50 MILLIGRAM(S): at 12:53

## 2019-09-25 RX ADMIN — TAMOXIFEN CITRATE 20 MILLIGRAM(S): 20 TABLET, FILM COATED ORAL at 12:53

## 2019-09-25 RX ADMIN — NYSTATIN CREAM 1 APPLICATION(S): 100000 CREAM TOPICAL at 18:15

## 2019-09-25 RX ADMIN — SODIUM CHLORIDE 50 MILLILITER(S): 9 INJECTION, SOLUTION INTRAVENOUS at 03:44

## 2019-09-25 RX ADMIN — Medication 50 MILLIGRAM(S): at 17:17

## 2019-09-25 RX ADMIN — Medication 81 MILLIGRAM(S): at 13:12

## 2019-09-25 RX ADMIN — Medication 153 MILLIGRAM(S): at 06:09

## 2019-09-25 RX ADMIN — Medication 153 MILLIGRAM(S): at 13:12

## 2019-09-25 RX ADMIN — Medication 650 MILLIGRAM(S): at 02:40

## 2019-09-25 RX ADMIN — Medication 102 MILLIGRAM(S): at 04:30

## 2019-09-25 RX ADMIN — CEFEPIME 100 MILLIGRAM(S): 1 INJECTION, POWDER, FOR SOLUTION INTRAMUSCULAR; INTRAVENOUS at 11:47

## 2019-09-25 RX ADMIN — Medication 650 MILLIGRAM(S): at 21:55

## 2019-09-25 RX ADMIN — INSULIN HUMAN 4 UNIT(S)/HR: 100 INJECTION, SOLUTION SUBCUTANEOUS at 08:48

## 2019-09-25 RX ADMIN — Medication 1000 UNIT(S): at 12:54

## 2019-09-25 RX ADMIN — INSULIN HUMAN 4 UNIT(S)/HR: 100 INJECTION, SOLUTION SUBCUTANEOUS at 21:17

## 2019-09-25 NOTE — PROGRESS NOTE ADULT - ASSESSMENT
70 yo male with morbid obesity, DM, hypothyrroidism, CAD, CHFrEF, BiV AICD, PVD, prostate cancer, breast cancer s/p mastectomy on tamoxifen, recently diagnosed myeloma, presented with fatigue and fever, found to be hypotensive likely due to septic shock, also with VIKTORIYA.    Plan:  1) septic shock -   source unclear, cultures and RVP pending, underwent pan-CT which was unrevealing  empiric abx  titrating levophed, already fluid resuscitated     2) VIKTORIYA -  improving with hydration and pressor support    3) CHFrEF -   holding entresto and coreg due to shock  TTE ordered   seems euvolemic to slightly hypervolemic, d/c fluids    4) CAD-   ASA, lipitor ordered  holding coreg    5) MAIRA on nocturnal cpap  - cpap ordered (patient doesn't know home settings)

## 2019-09-25 NOTE — SEPSIS NOTE - NSSUBJFT_GEN_A_CORE
Pt is a 68 y/o morbidly obese male with PMHx DM, hypothyroid, CAD, ICM/ HFrEF s/p BiVICD, PVD, remote prostate CA, Colon CA s/p resection, L breast CA s/p mastectomy (on Tamoxifen) and recently diagnosed w/ MM ( f/u at [facility] [place]) presented to the ED with weakness and chills. In ED pt febrile to 102.7, hypotensive with BP 74/47 s/p 3.5L IVF. Placed on Levo as pt with severe BiV HF and caution with further IVF administration. Admitted to MICU for Septic Shock unclear source. Pending official Pan CT Scan reads. Pan cultures sent. Pending official echo and repeat lactate for further IVF resuscitation. Started on Zosyn/Vanco. CXR shows b/l opacities (infiltrative vs cardiogenic pulmonary edema).

## 2019-09-25 NOTE — PATIENT PROFILE ADULT - STATED REASON FOR ADMISSION
pt daughter called EMS when pt couldn't get out of the car, he was too fatigued and kept going in and out of consciousness

## 2019-09-25 NOTE — PROGRESS NOTE ADULT - SUBJECTIVE AND OBJECTIVE BOX
Patient is a 69y old  Male who presents with a chief complaint of Shock (24 Sep 2019 21:28)      BRIEF HOSPITAL COURSE:   69M with PMHx morbid obesity, DM, hypothyroidism, CAD (? stents), ICM, sp BiVICD, PVD, prostate CA, colon CA sp rsxn, L breast CA sp mastectomy, [place] [facility] who presented to ED with fatigue and history of recurrent falls in last few weeks. Workup in ED revealed lactemia, leukocytosis, fever 102.7. Received fluid resuscitation initially, but with hypotension (SBP 70s) despite fluids, pressor initiated. ICU consulted for further management.  	      Events last 24 hours: Remains on pressor support, afebrile overnight, placed on CPAP home settings, CTH/A/P completed.     PAST MEDICAL & SURGICAL HISTORY:  Prostate cancer  DM (diabetes mellitus)  HTN (hypertension)  Breast cancer      Review of Systems:  CONSTITUTIONAL: No fever, chills, + fatigue  EYES: No eye pain, visual disturbances, or discharge  ENMT:  No difficulty hearing, tinnitus, vertigo; No sinus or throat pain  NECK: No pain or stiffness  RESPIRATORY: No cough, wheezing, chills or hemoptysis; No shortness of breath  CARDIOVASCULAR: No chest pain, palpitations, dizziness, or leg swelling  GASTROINTESTINAL: No abdominal or epigastric pain. No nausea, vomiting, or hematemesis; No diarrhea or constipation. No melena or hematochezia.  GENITOURINARY: No dysuria, frequency, hematuria, or incontinence  NEUROLOGICAL: No headaches, memory loss, loss of strength, numbness, or tremors  MUSCULOSKELETAL: No joint pain or swelling; No muscle, + back pain, No extremity pain        Medications:  piperacillin/tazobactam IVPB.. 3.375 Gram(s) IV Intermittent every 8 hours  vancomycin  IVPB 1500 milliGRAM(s) IV Intermittent every 24 hours    norepinephrine Infusion 0.05 MICROgram(s)/kG/Min IV Continuous <Continuous>      acetaminophen   Tablet .. 650 milliGRAM(s) Oral every 6 hours PRN    tamoxifen 20 milliGRAM(s) Oral daily    aspirin enteric coated 81 milliGRAM(s) Oral daily  heparin  Injectable 5000 Unit(s) SubCutaneous every 8 hours        dextrose 40% Gel 15 Gram(s) Oral once PRN  dextrose 50% Injectable 12.5 Gram(s) IV Push once  dextrose 50% Injectable 25 Gram(s) IV Push once  dextrose 50% Injectable 25 Gram(s) IV Push once  glucagon  Injectable 1 milliGRAM(s) IntraMuscular once PRN  hydrocortisone sodium succinate Injectable 50 milliGRAM(s) IV Push every 6 hours  insulin lispro (HumaLOG) corrective regimen sliding scale   SubCutaneous three times a day before meals  insulin lispro Injectable (HumaLOG) 4 Unit(s) SubCutaneous once  levothyroxine 75 MICROGram(s) Oral daily    ascorbic acid IVPB 1500 milliGRAM(s) IV Intermittent every 6 hours  dextrose 5%. 1000 milliLiter(s) IV Continuous <Continuous>  thiamine IVPB 200 milliGRAM(s) IV Intermittent <User Schedule>    influenza   Vaccine 0.5 milliLiter(s) IntraMuscular once    chlorhexidine 2% Cloths 1 Application(s) Topical <User Schedule>  nystatin Cream 1 Application(s) Topical two times a day            ICU Vital Signs Last 24 Hrs  T(C): 37.7 (25 Sep 2019 01:01), Max: 39.2 (24 Sep 2019 17:18)  T(F): 99.9 (25 Sep 2019 01:01), Max: 102.6 (24 Sep 2019 17:18)  HR: 81 (25 Sep 2019 02:00) (81 - 120)  BP: 86/49 (25 Sep 2019 02:00) (74/47 - 128/58)  BP(mean): 64 (25 Sep 2019 02:00) (56 - 83)  ABP: --  ABP(mean): --  RR: 18 (25 Sep 2019 02:00) (18 - 24)  SpO2: 98% (25 Sep 2019 02:00) (95% - 99%)                LABS:                        12.0   15.26 )-----------( 207      ( 24 Sep 2019 17:25 )             37.6     09-24    132<L>  |  93<L>  |  40.0<H>  ----------------------------<  253<H>  5.3   |  21.0<L>  |  2.03<H>    Ca    9.0      24 Sep 2019 17:25  Mg     1.5     -    TPro  7.3  /  Alb  3.7  /  TBili  0.7  /  DBili  x   /  AST  27  /  ALT  20  /  AlkPhos  69  09-24      CARDIAC MARKERS ( 24 Sep 2019 20:32 )  x     / <0.01 ng/mL / x     / x     / x      CARDIAC MARKERS ( 24 Sep 2019 17:25 )  x     / x     / 549 U/L / x     / 2.3 ng/mL      CAPILLARY BLOOD GLUCOSE        PT/INR - ( 24 Sep 2019 17:25 )   PT: 14.2 sec;   INR: 1.23 ratio         PTT - ( 24 Sep 2019 17:25 )  PTT:26.3 sec  Urinalysis Basic - ( 24 Sep 2019 19:17 )    Color: Yellow / Appearance: Clear / S.010 / pH: x  Gluc: x / Ketone: Negative  / Bili: Negative / Urobili: Negative mg/dL   Blood: x / Protein: 15 mg/dL / Nitrite: Negative   Leuk Esterase: Negative / RBC: 0-2 /HPF / WBC 0-2   Sq Epi: x / Non Sq Epi: Occasional / Bacteria: Occasional      CULTURES: pending      Physical Examination:    General: No acute distress.  Alert, oriented x 3, interactive, nonfocal    HEENT: Pupils equal, reactive to light.  Symmetric, sclera anicteric    PULM: Clear to auscultation bilaterally anteriorly, based not able to appreciated due to body habitus, no significant sputum production    CVS: Regular rate and rhythm, no murmurs    ABD: morbidly obese, well healed upper abd scar, Soft, nondistended, nontender, normoactive bowel sounds, no rebound or guarding, bilateral groin fungal rash    EXT: + edema BLE pitting, LE skin with some redness, no lesions/drainage, nontender, cap refill<2 sec    SKIN: Warm and well perfused, bilateral groin fungal rash    RADIOLOGY:   *****PRELIMINARY REPORT******    ******PRELIMINARY REPORT******           EXAM:  CT BRAIN                          PROCEDURE DATE:  2019        ******PRELIMINARY REPORT******    ******PRELIMINARY REPORT******          INTERPRETATION:  [facility] RADIOLOGIST PRELIMINARY REPORT    PROCEDURE INFORMATION:   Exam: CT Head Without Contrast   Exam date and time: 2019 11:55 PM   Clinical history: 69 years old, male; Fever     TECHNIQUE:   Imaging protocol: Computed tomography of the head without contrast.     COMPARISON:   No relevant prior studies available.     FINDINGS:   Brain: Mild small vessel ischemic changes in the periventricular white   matter.   No evidence of an acute cortical infarct.   Ventricles: Normal. No ventriculomegaly.   Bones/joints: Unremarkable. No acute fracture.   Sinuses: Mild ethmoid sinus disease.   Mastoid air cells: Visualized mastoid air cells are well aerated.   Soft tissues: Unremarkable.     IMPRESSION:   No acute intracranial abnormality. Changes of an acute infarct may not be   visible on CT for up to 24 to 48 hours.    ******PRELIMINARY REPORT******    ******PRELIMINARY REPORT******           EXAM:  CT CHEST                          PROCEDURE DATE:  2019        ******PRELIMINARY REPORT******    ******PRELIMINARY REPORT******          INTERPRETATION:  [facility] RADIOLOGIST PRELIMINARY REPORT    PROCEDURE INFORMATION:   Exam: CT Chest Without Contrast   Exam date and time: 2019 11:57 PM   Clinical history: 69 years old, male; Fever; Shortness of breath     TECHNIQUE:   Imaging protocol: Computed tomography of the chest without contrast.   3D rendering: MIP reconstructed images were created and reviewed.     COMPARISON:   CR XR CHEST IMMEDIATE 2019 5:35 PM     FINDINGS:   Limitations: Motion artifact and beam hardening artifact from pacemaker   limits   this study.   Tubes, catheters and devices: Right thoracic wall metallic pacemaker   wires   within the right heart. Few epicardial wires along the posterior aspect   of the   heart.   Lungs: No evidence endobronchial lesion. Mild bilateral lower lobe linear   air   space opacity-atelectasis/scarring.   Pleural space: No evidence of pneumothorax.   Heart: Heart appears within normal limits, no pericardial effusion. No   evidence   of filling defects in the cardiac chambers.   Aorta: Chronic atherosclerotic calcification of the vasculature.   Lymph nodes: Unremarkable. No enlarged lymph nodes.   Bones/joints: Musculoskeletal structures appear intact. Multi-level   degenerative changes involve the thoracic spine.   Soft tissues: Unremarkable.     IMPRESSION:   1. Motion artifact and beam hardening artifact from pacemaker limits this   study.   2. Mild bilateral lower lobe linear air space   opacity-atelectasis/scarring.     =========================  PROCEDURE INFORMATION:   Exam: CT Abdomen and Pelvis Without Contrast   Exam date and time: 2019 11:57 PM   Clinical history: 69 years old, male; Fever; Shortness of breath     TECHNIQUE:   Imaging protocol: Computed tomography of the abdomen and pelvis without   contrast.   3D rendering: MIP reconstructed images were created and reviewed.     COMPARISON:   CR XR CHEST IMMEDIATE 2019 5:35 PM     FINDINGS:   Limitations: Moderate artifact from overlying soft tissues limits this   study.     Liver: Normal. No mass.   Gallbladder and bile ducts: Rounded 6 mm calcific stone within the   gallbladder.   Pancreas: Normal. No ductal dilation.   Spleen: Normal. No splenomegaly.   Adrenals: Normal. No mass.   Kidneys and ureters: Rounded 1-2 mm stones bilateral kidneys.   Stomach and bowel: No evidence of small bowel obstruction. Surgical   sutures are   present along the stomach.   Appendix: Appendix - visualized portions appear normal.   Intraperitoneal space: Unremarkable. No free air. No significant fluid   collection.   Vasculature: Chronic atherosclerotic calcification of the vasculature.   Lymph nodes: Unremarkable. No enlarged lymph nodes.     Bladder: Unremarkable as visualized.   Reproductive: Prostate not seen well.   Bones/joints: Chronic degenerative changes of the lumbar spine.   Soft tissues: Unremarkable.     IMPRESSION:   1. Moderate artifact from overlying soft tissues limits this study.   2. No evidence of small bowel obstruction.   3. Gallstone. No evidence of intrahepatic or extrahepatic biliary ductal   dilatation.   4. Bilateral renal nonobstructive stones.   5. Prior gastric surgery.   But          ******PRELIMINARY REPORT******    ******PRELIMINARY REPORT******          Ventricular Rate 102 BPM    Atrial Rate 102 BPM    QRS Duration 126 ms    Q-T Interval 390 ms    QTC Calculation(Bezet) 508 ms    P Axis 28 degrees    R Axis 255 degrees    T Axis 62 degrees    Diagnosis Line *** Poor data quality, interpretation may be adversely affected  Ventricular-paced rhythm  Abnormal ECG    Confirmed by KEILA BEARD ([id number]) on 2019 11:35:24 PM      Critical Care time: 40 mins assessing presenting problems of acute illness that poses high probability of life threatening deterioration or end organ damage/dysfunction.  Medical decision making including Initiating plan of care, reviewing data, reviewing radiology, direct patient bedside evaluation and interpretation of vital signs, any necessary ventilator management , discussion with multidisciplinary team, discussing goals of care with patient/family, all non inclusive of procedures.

## 2019-09-25 NOTE — PROGRESS NOTE ADULT - SUBJECTIVE AND OBJECTIVE BOX
INTERVAL HPI/OVERNIGHT EVENTS: none    PHYSICAL EXAM:  GENERAL: NAD   HEAD:  Atraumatic, normocephalic  EYES: EOMI, PERRL, sclera and conjunctiva clear  ENMT:  MMM, No oropharyngeal erythema or exudates  NECK:  Supple, normal appearance, trachea midline,   NERVOUS SYSTEM:  Alert & Oriented X3, Symmetric strength in all extremities    CHEST/LUNG: Bilateral air entry, prior left mastectomy, no crackles or wheeze  HEART: Regular rate, regular rhythm;   ABDOMEN: Obese, No rebound or guarding, bowel sounds present  EXTREMITIES:  bilat LE pitting edema with bilat LE erythema , no clubbing, no cyanosis.  LYMPH:  No lymphadenopathy noted  SKIN:  No visible rashes or skin lesions, good capillary refill  PSYCH: appropriate affect and behavior    RADIOLOGY personally reviewed: CT chest - no infiltrates or large effusions   GOALS OF CARE DISCUSSION WITH PATIENT/FAMILY/PROXY: patient updated on current findings and plan of care  CRITICAL CARE TIME SPENT: 35 min spent titrating vasopressors, monitoring hemodynamic status, adjusting other therapies in this critically ill patient with life threatening shock and renal failure   --------------------------------------------------------------------------------------------------------------------------------------------------------------  On Admission  19 (1d)  HPI:  69 morbidly obese male w/ PMHx DM, hypothyroid, CAD, ICM/ HFrEF s/p BiVICD, PVD, remote prostate CA, Colon CA s/p resection, L breast CA s/p mastectomy (on Tamoxifen) and recently diagnosed w/ MM ( [place] [facility] [place] to [place] ED with fatigue. He supposedly could not get out of his car b/c of fatigue. Denies any chest pain, abd pain, N/V/D/C/fever/chills/dysuria or cough. H/o recurrent falls, last one about a week back when he had a head wound. Also endorses chronic back pain. In ED today he was found to be in shock, BP   74/47mmHG w/ T max 102.7 and lactate 5.3. Received a bolus ~ 3.5L and was started on Levo gtt. On [facility] eval pt was awake, sitting up in bed and did not have any new complaints  Did not take any of his meds today  EKG Paced rhythm. CXR appears mildly congested (24 Sep 2019 21:28)    PAST MEDICAL & SURGICAL HISTORY:  Prostate cancer  DM (diabetes mellitus)  HTN (hypertension)  Breast cancer      Antimicrobial:  piperacillin/tazobactam IVPB.. 3.375 Gram(s) IV Intermittent every 8 hours  vancomycin  IVPB 1500 milliGRAM(s) IV Intermittent every 24 hours    Cardiovascular:  norepinephrine Infusion 0.05 MICROgram(s)/kG/Min IV Continuous <Continuous>    Pulmonary:    Hematalogic:  aspirin enteric coated 81 milliGRAM(s) Oral daily  heparin  Injectable 5000 Unit(s) SubCutaneous every 8 hours    Other:  acetaminophen   Tablet .. 650 milliGRAM(s) Oral every 6 hours PRN  ascorbic acid IVPB 1500 milliGRAM(s) IV Intermittent every 6 hours  atorvastatin 40 milliGRAM(s) Oral at bedtime  chlorhexidine 2% Cloths 1 Application(s) Topical <User Schedule>  cholecalciferol 1000 Unit(s) Oral daily  cyanocobalamin 250 MICROGram(s) Oral daily  dextrose 40% Gel 15 Gram(s) Oral once PRN  dextrose 5%. 1000 milliLiter(s) IV Continuous <Continuous>  dextrose 50% Injectable 12.5 Gram(s) IV Push once  dextrose 50% Injectable 25 Gram(s) IV Push once  dextrose 50% Injectable 25 Gram(s) IV Push once  glucagon  Injectable 1 milliGRAM(s) IntraMuscular once PRN  hydrocortisone sodium succinate Injectable 50 milliGRAM(s) IV Push every 6 hours  influenza   Vaccine 0.5 milliLiter(s) IntraMuscular once  insulin lispro (HumaLOG) corrective regimen sliding scale   SubCutaneous three times a day before meals  insulin regular Infusion 4 Unit(s)/Hr IV Continuous <Continuous>  levothyroxine 75 MICROGram(s) Oral daily  magnesium sulfate  IVPB 2 Gram(s) IV Intermittent once  nystatin Cream 1 Application(s) Topical two times a day  tamoxifen 20 milliGRAM(s) Oral daily  thiamine IVPB 200 milliGRAM(s) IV Intermittent <User Schedule>      Drug Dosing Weight  Height (cm): 170.2 (25 Sep 2019 00:45)  Weight (kg): 179.8 (25 Sep 2019 00:45)  BMI (kg/m2): 62.1 (25 Sep 2019 00:45)  BSA (m2): 2.7 (25 Sep 2019 00:45)    T(C): 37.9 (19 @ 03:05), Max: 39.2 (19 @ 17:18)  HR: 78 (19 @ 04:36)  BP: 93/52 (19 @ 02:45)  BP(mean): 68 (19 @ 02:45)  ABP: --  ABP(mean): --  RR: 18 (19 @ 02:45)  SpO2: 100% (19 @ 04:36)           @ 07:01  -   @ 07:00  --------------------------------------------------------  IN: 200.6 mL / OUT: 0 mL / NET: 200.6 mL              LABS:  CBC Full  -  ( 25 Sep 2019 06:02 )  WBC Count : 28.47 K/uL  RBC Count : 3.41 M/uL  Hemoglobin : 11.1 g/dL  Hematocrit : 33.8 %  Platelet Count - Automated : 208 K/uL  Mean Cell Volume : 99.1 fl  Mean Cell Hemoglobin : 32.6 pg  Mean Cell Hemoglobin Concentration : 32.8 gm/dL  Auto Neutrophil # : x  Auto Lymphocyte # : x  Auto Monocyte # : x  Auto Eosinophil # : x  Auto Basophil # : x  Auto Neutrophil % : x  Auto Lymphocyte % : x  Auto Monocyte % : x  Auto Eosinophil % : x  Auto Basophil % : x        133<L>  |  96<L>  |  41.0<H>  ----------------------------<  245<H>  5.5<H>   |  22.0  |  1.84<H>    Ca    8.5<L>      25 Sep 2019 06:02  Mg     1.6         TPro  7.3  /  Alb  3.7  /  TBili  0.7  /  DBili  x   /  AST  27  /  ALT  20  /  AlkPhos  69      PT/INR - ( 24 Sep 2019 17:25 )   PT: 14.2 sec;   INR: 1.23 ratio         PTT - ( 24 Sep 2019 17:25 )  PTT:26.3 sec  Urinalysis Basic - ( 24 Sep 2019 19:17 )    Color: Yellow / Appearance: Clear / S.010 / pH: x  Gluc: x / Ketone: Negative  / Bili: Negative / Urobili: Negative mg/dL   Blood: x / Protein: 15 mg/dL / Nitrite: Negative   Leuk Esterase: Negative / RBC: 0-2 /HPF / WBC 0-2   Sq Epi: x / Non Sq Epi: Occasional / Bacteria: Occasional

## 2019-09-25 NOTE — SEPSIS NOTE - ASSESSMENT
Remains in shock state. No clear source obtained. On Levo gtt.   Awaiting repeat lactate s/p IVF resuscitation. Withheld 30cc/kg protocol given edematous clinically in setting of severe HF (AICD, unknown EF, pending official echo) CXR b/l opacities representative of infiltrative process vs cardiogenic pulmonary edema. Delayed labs due to difficulty with obtaining Pan CT Scan (pt difficulty obtaining scans due to weight/size).   Will hold off on further IVF resuscitation until lactate returns. If still elevated, will cautiously administer further IVF given history of BiV HF with ICD placement. As above, waiting official echo to accurately assess cardiac function given poor POCUS windows.   Started on Zosyn/Vanco.   Pan scan's awaiting official reads, UA negative. Still unclear source of Septic shock.   Cultures sent.   Full A/P as H&P. Remains in shock state. No clear source obtained. On Levo gtt.   Awaiting repeat lactate s/p IVF resuscitation. Withheld 30cc/kg protocol given edematous clinically in setting of severe HF (AICD, unknown EF, pending official echo) CXR b/l opacities representative of infiltrative process vs cardiogenic pulmonary edema. Delayed labs due to difficulty with obtaining Pan CT Scan (pt difficulty obtaining scans due to weight/size).   Will hold off on further IVF resuscitation until lactate returns. If still elevated, will cautiously administer further IVF given history of BiV HF with ICD placement. As above, waiting official echo to accurately assess cardiac function given poor POCUS windows.   Started on Zosyn/Vanco.   Pan scan's awaiting official reads, UA negative. Still unclear source of Septic shock.   Cultures sent.   Full A/P as H&P.     ADDENDUM:   Repeat Lactate 4.1, down from 5.3 s/p 3100CC's IVF. Pt with immediate cap refill, warm and well perfused, appears adequately fluid resuscitated despite <30cc/kg protocol. Decreased levophed requirements. Pt with poor cardiac function and opacities b/l.   Will hold off on further IVF resuscitation and repeat lactate in 4 hours.

## 2019-09-25 NOTE — PROGRESS NOTE ADULT - ASSESSMENT
69M with PMHx morbid obesity, MAIRA (pt with home CPAP), DM, hypothyroidism, CAD (? stents), ICM, sp BiVICD, PVD, prostate CA, colon CA sp rsxn, L breast CA sp mastectomy, MM with:  1. septic shock- lactate trending downward, cap refill < 2sec, source remains unclear at this time  2. metabolic acidosis  3. acute kidney injury versus CKD  4. DM  5. Cardiomyopathy (presumed ischemic)  6. hypothyroidism      Plan:  1. cont titrating pressors for MAP>65, holding on further challenges given his hx of significant CM, will place on low dose IVF with balanced fluids, holding all anti-HTN for now  2. 2D echo  3. trend lactate  4. will place talbot given his critical illness, elevated Cr and need to monitor his urine output closely  5. avoid MAP<65, avoid nephrotoxins, renally adjust all meds, trend Cr  6. Merik protocol  7. check TSH, cortisol  8. broad spectrum IV abx, check procal, follow up cultures  9. HS CPAP 10  10. ASA daily  11. DVT proph with HSQ  12. glycemic control with FSG [id number], ISS coverage, attempt to keep FSG <180, will place on CII if labile

## 2019-09-26 LAB
ANION GAP SERPL CALC-SCNC: 10 MMOL/L — SIGNIFICANT CHANGE UP (ref 5–17)
BUN SERPL-MCNC: 36 MG/DL — HIGH (ref 8–20)
CALCIUM SERPL-MCNC: 8.8 MG/DL — SIGNIFICANT CHANGE UP (ref 8.6–10.2)
CHLORIDE SERPL-SCNC: 101 MMOL/L — SIGNIFICANT CHANGE UP (ref 98–107)
CK SERPL-CCNC: 2626 U/L — HIGH (ref 30–200)
CO2 SERPL-SCNC: 26 MMOL/L — SIGNIFICANT CHANGE UP (ref 22–29)
CREAT SERPL-MCNC: 1.34 MG/DL — HIGH (ref 0.5–1.3)
GLUCOSE BLDC GLUCOMTR-MCNC: 123 MG/DL — HIGH (ref 70–99)
GLUCOSE BLDC GLUCOMTR-MCNC: 126 MG/DL — HIGH (ref 70–99)
GLUCOSE BLDC GLUCOMTR-MCNC: 129 MG/DL — HIGH (ref 70–99)
GLUCOSE BLDC GLUCOMTR-MCNC: 129 MG/DL — HIGH (ref 70–99)
GLUCOSE BLDC GLUCOMTR-MCNC: 131 MG/DL — HIGH (ref 70–99)
GLUCOSE BLDC GLUCOMTR-MCNC: 135 MG/DL — HIGH (ref 70–99)
GLUCOSE BLDC GLUCOMTR-MCNC: 151 MG/DL — HIGH (ref 70–99)
GLUCOSE BLDC GLUCOMTR-MCNC: 160 MG/DL — HIGH (ref 70–99)
GLUCOSE BLDC GLUCOMTR-MCNC: 163 MG/DL — HIGH (ref 70–99)
GLUCOSE BLDC GLUCOMTR-MCNC: 163 MG/DL — HIGH (ref 70–99)
GLUCOSE BLDC GLUCOMTR-MCNC: 167 MG/DL — HIGH (ref 70–99)
GLUCOSE BLDC GLUCOMTR-MCNC: 208 MG/DL — HIGH (ref 70–99)
GLUCOSE BLDC GLUCOMTR-MCNC: 221 MG/DL — HIGH (ref 70–99)
GLUCOSE SERPL-MCNC: 134 MG/DL — HIGH (ref 70–115)
HCT VFR BLD CALC: 31 % — LOW (ref 39–50)
HCV RNA FLD QL NAA+PROBE: SIGNIFICANT CHANGE UP
HCV RNA SPEC QL PROBE+SIG AMP: SIGNIFICANT CHANGE UP
HGB BLD-MCNC: 10 G/DL — LOW (ref 13–17)
LACTATE BLDV-MCNC: 1.2 MMOL/L — SIGNIFICANT CHANGE UP (ref 0.5–2)
MAGNESIUM SERPL-MCNC: 2.2 MG/DL — SIGNIFICANT CHANGE UP (ref 1.6–2.6)
MCHC RBC-ENTMCNC: 32.3 GM/DL — SIGNIFICANT CHANGE UP (ref 32–36)
MCHC RBC-ENTMCNC: 32.5 PG — SIGNIFICANT CHANGE UP (ref 27–34)
MCV RBC AUTO: 100.6 FL — HIGH (ref 80–100)
PHOSPHATE SERPL-MCNC: 3.1 MG/DL — SIGNIFICANT CHANGE UP (ref 2.4–4.7)
PLATELET # BLD AUTO: 159 K/UL — SIGNIFICANT CHANGE UP (ref 150–400)
POTASSIUM SERPL-MCNC: 3.8 MMOL/L — SIGNIFICANT CHANGE UP (ref 3.5–5.3)
POTASSIUM SERPL-SCNC: 3.8 MMOL/L — SIGNIFICANT CHANGE UP (ref 3.5–5.3)
PROCALCITONIN SERPL-MCNC: 23.75 NG/ML — HIGH (ref 0.02–0.1)
RBC # BLD: 3.08 M/UL — LOW (ref 4.2–5.8)
RBC # FLD: 15.2 % — HIGH (ref 10.3–14.5)
SODIUM SERPL-SCNC: 137 MMOL/L — SIGNIFICANT CHANGE UP (ref 135–145)
VANCOMYCIN TROUGH SERPL-MCNC: 7.8 UG/ML — LOW (ref 10–20)
WBC # BLD: 19.54 K/UL — HIGH (ref 3.8–10.5)
WBC # FLD AUTO: 19.54 K/UL — HIGH (ref 3.8–10.5)

## 2019-09-26 PROCEDURE — 99222 1ST HOSP IP/OBS MODERATE 55: CPT

## 2019-09-26 RX ORDER — INSULIN LISPRO 100/ML
5 VIAL (ML) SUBCUTANEOUS
Refills: 0 | Status: DISCONTINUED | OUTPATIENT
Start: 2019-09-26 | End: 2019-09-30

## 2019-09-26 RX ORDER — INSULIN GLARGINE 100 [IU]/ML
15 INJECTION, SOLUTION SUBCUTANEOUS EVERY MORNING
Refills: 0 | Status: DISCONTINUED | OUTPATIENT
Start: 2019-09-27 | End: 2019-09-30

## 2019-09-26 RX ORDER — VANCOMYCIN HCL 1 G
1500 VIAL (EA) INTRAVENOUS EVERY 12 HOURS
Refills: 0 | Status: DISCONTINUED | OUTPATIENT
Start: 2019-09-26 | End: 2019-09-29

## 2019-09-26 RX ORDER — INSULIN LISPRO 100/ML
VIAL (ML) SUBCUTANEOUS
Refills: 0 | Status: DISCONTINUED | OUTPATIENT
Start: 2019-09-26 | End: 2019-09-30

## 2019-09-26 RX ORDER — INSULIN GLARGINE 100 [IU]/ML
15 INJECTION, SOLUTION SUBCUTANEOUS ONCE
Refills: 0 | Status: COMPLETED | OUTPATIENT
Start: 2019-09-26 | End: 2019-09-26

## 2019-09-26 RX ADMIN — Medication 650 MILLIGRAM(S): at 05:01

## 2019-09-26 RX ADMIN — HEPARIN SODIUM 7500 UNIT(S): 5000 INJECTION INTRAVENOUS; SUBCUTANEOUS at 22:20

## 2019-09-26 RX ADMIN — Medication 650 MILLIGRAM(S): at 05:30

## 2019-09-26 RX ADMIN — Medication 81 MILLIGRAM(S): at 12:08

## 2019-09-26 RX ADMIN — NYSTATIN CREAM 1 APPLICATION(S): 100000 CREAM TOPICAL at 17:03

## 2019-09-26 RX ADMIN — MIDODRINE HYDROCHLORIDE 5 MILLIGRAM(S): 2.5 TABLET ORAL at 14:12

## 2019-09-26 RX ADMIN — Medication 1000 UNIT(S): at 12:05

## 2019-09-26 RX ADMIN — TAMSULOSIN HYDROCHLORIDE 0.4 MILLIGRAM(S): 0.4 CAPSULE ORAL at 22:19

## 2019-09-26 RX ADMIN — Medication 4: at 22:20

## 2019-09-26 RX ADMIN — Medication 2: at 12:15

## 2019-09-26 RX ADMIN — Medication 75 MICROGRAM(S): at 05:01

## 2019-09-26 RX ADMIN — Medication 650 MILLIGRAM(S): at 22:50

## 2019-09-26 RX ADMIN — Medication 5 UNIT(S): at 12:15

## 2019-09-26 RX ADMIN — TAMOXIFEN CITRATE 20 MILLIGRAM(S): 20 TABLET, FILM COATED ORAL at 12:05

## 2019-09-26 RX ADMIN — CEFEPIME 100 MILLIGRAM(S): 1 INJECTION, POWDER, FOR SOLUTION INTRAMUSCULAR; INTRAVENOUS at 22:20

## 2019-09-26 RX ADMIN — Medication 300 MILLIGRAM(S): at 17:47

## 2019-09-26 RX ADMIN — CHLORHEXIDINE GLUCONATE 1 APPLICATION(S): 213 SOLUTION TOPICAL at 05:03

## 2019-09-26 RX ADMIN — NYSTATIN CREAM 1 APPLICATION(S): 100000 CREAM TOPICAL at 05:04

## 2019-09-26 RX ADMIN — HEPARIN SODIUM 7500 UNIT(S): 5000 INJECTION INTRAVENOUS; SUBCUTANEOUS at 05:03

## 2019-09-26 RX ADMIN — MIDODRINE HYDROCHLORIDE 5 MILLIGRAM(S): 2.5 TABLET ORAL at 22:20

## 2019-09-26 RX ADMIN — CEFEPIME 100 MILLIGRAM(S): 1 INJECTION, POWDER, FOR SOLUTION INTRAMUSCULAR; INTRAVENOUS at 10:25

## 2019-09-26 RX ADMIN — INSULIN GLARGINE 15 UNIT(S): 100 INJECTION, SOLUTION SUBCUTANEOUS at 10:25

## 2019-09-26 RX ADMIN — Medication 5 UNIT(S): at 16:51

## 2019-09-26 RX ADMIN — Medication 300 MILLIGRAM(S): at 04:31

## 2019-09-26 RX ADMIN — Medication 4: at 16:51

## 2019-09-26 RX ADMIN — Medication 650 MILLIGRAM(S): at 22:20

## 2019-09-26 RX ADMIN — HEPARIN SODIUM 7500 UNIT(S): 5000 INJECTION INTRAVENOUS; SUBCUTANEOUS at 14:12

## 2019-09-26 RX ADMIN — MIDODRINE HYDROCHLORIDE 5 MILLIGRAM(S): 2.5 TABLET ORAL at 05:01

## 2019-09-26 NOTE — PROGRESS NOTE ADULT - ASSESSMENT
Pt is a 70 y/o M w/ a PMHx of prostate CA, colon CA s/p resection, L breast CA s/p resection (on Tamoxifen), recently diagnosed multiple myeloma (followed at [facility] [place]), ischemic CM s/p BiV ICD, CAD, T2DM, HTN, MAIRA on home CPAP, morbid obesity, who presented to the ED c/o worsening fatigue. Found to be in septic shock 2/2 unknown source, with VIKTORIYA, anion gap metabolic acidosis, rhabdomyolysis. Pt remained hypotensive despite IVF resuscitation and was admitted to the MICU for vasopressor support. At this time, pt is stable for transfer to monitored bed.    PLAN:    NEURO:  CV:  PULM:  GI:  RENAL:  ENDO:  ID:  HEME/ONC:  DISPO: Pt is a 70 y/o M w/ a PMHx of prostate CA, colon CA s/p resection, L breast CA s/p resection (on Tamoxifen), recently diagnosed multiple myeloma (followed at [facility] [place]), ischemic CM s/p BiV ICD, CAD, T2DM, HTN, MAIRA on home CPAP, morbid obesity, who presented to the ED c/o worsening fatigue. Found to be in septic shock 2/2 unknown source, with VIKTORIYA, anion gap metabolic acidosis, rhabdomyolysis. Pt remained hypotensive despite IVF resuscitation and was admitted to the MICU for vasopressor support. Pt developed worsening hyperglycemia requiring insulin gtt. Pt has been off Levophed since 9 PM, on Midodrine 5 mg q8 hrs. LE doppler negative. Tolerated BiPAP overnight. Echo resulted, EF 50-55%. Insulin drip off. At this time, pt is stable for transfer to monitored bed.    PLAN:    NEURO: No active issues.  CV: Hypotension resolved, weaned off of Levophed. Midodrine 5 mg q8 hrs. MAP goal > 65. Home antihypertensives held at this time.  PULM: MAIRA on CPAP at home, w/ periods of apnea and hypoventilation. Trialed on BiPAP overnight, tolerated well.  GI: Consistent carb diet. No active issues.  RENAL: VIKTORIYA (BUN/Cr downtrending) and rhabdomyolysis (CK downtrending, 4786 to 2626). Monitor lytes. Avoid nephrotoxic agents.  ENDO: Hx of DM type 2. Persistent hyperglycemia resolving, off insulin drip. Lantus 15 mg every morning, Humalog 5 mg before meals, Insulin s/s. Synthyroid 75 mcg daily.  ID: Continue to treat empirically w/ Cefepime and vancomycin. RVP negative, urine culture negative. CXR and pan-CT unrevealing. Afebrile. Lactate downtrending (4.1 to 1.2), WBC count downtrending (28 to 19).  HEME/ONC: DVT prophylaxis 7500 units SQ q8 hrs.  DISPO: Full code. Pt stable for transfer to monitored bed. Pt is a 70 y/o M w/ a PMHx of prostate CA, colon CA s/p resection, L breast CA s/p resection (on Tamoxifen), recently diagnosed multiple myeloma (followed at [facility] [place]), ischemic CM s/p BiV ICD, CAD, T2DM, HTN, MAIRA on home CPAP, morbid obesity, who presented to the ED c/o worsening fatigue. Found to be in septic shock 2/2 unknown source, with VIKTORIYA, anion gap metabolic acidosis, rhabdomyolysis. Pt remained hypotensive despite IVF resuscitation and was admitted to the MICU for vasopressor support. Pt developed worsening hyperglycemia requiring insulin gtt. Pt has been off Levophed since 9 PM, on Midodrine 5 mg q8 hrs. LE doppler negative. Tolerated BiPAP overnight. Echo resulted, EF 50-55%. Insulin drip off. At this time, pt is stable for transfer to telemetry.    PLAN:    NEURO: No active issues.  CV: Hypotension resolved, weaned off of Levophed. Midodrine 5 mg q8 hrs. MAP goal > 65. Home antihypertensives held at this time.  PULM: MAIRA on CPAP at home, w/ periods of apnea and hypoventilation. Trialed on BiPAP overnight, tolerated well.  GI: Consistent carb diet. No active issues.  RENAL: VIKTORIYA (BUN/Cr downtrending) and rhabdomyolysis (CK downtrending, 4786 to 2626). Monitor lytes. Avoid nephrotoxic agents.  ENDO: Hx of DM type 2. Persistent hyperglycemia resolving, off insulin drip. Lantus 15 mg every morning, Humalog 5 mg before meals, Insulin s/s. Synthyroid 75 mcg daily.  ID: Continue to treat empirically w/ Cefepime and vancomycin. RVP negative, urine culture negative. CXR and pan-CT unrevealing. Afebrile. Lactate downtrending (4.1 to 1.2), WBC count downtrending (28 to 19).  HEME/ONC: DVT prophylaxis 7500 units SQ q8 hrs.  DISPO: Full code. Pt stable for transfer to monitored bed. Pt is a 68 y/o M w/ a PMHx of prostate CA, colon CA s/p resection, L breast CA s/p resection (on Tamoxifen), recently diagnosed multiple myeloma (followed at [facility] [place]), ischemic CM s/p BiV ICD, CAD, T2DM, HTN, MAIRA on home CPAP, morbid obesity, who presented to the ED c/o worsening fatigue. Found to be in septic shock 2/2 unknown source, with VIKTORIYA, anion gap metabolic acidosis, rhabdomyolysis. Pt remained hypotensive despite IVF resuscitation and was admitted to the MICU for vasopressor support. Pt developed worsening hyperglycemia requiring insulin gtt. Pt has been off Levophed since 9 PM, on Midodrine 5 mg q8 hrs. LE doppler negative. Tolerated BiPAP overnight. Echo resulted, EF 50-55%. Insulin drip off. At this time, pt is stable for transfer to telemetry.    PLAN:    NEURO: No active issues.  CV: Hypotension resolved, weaned off of Levophed. Midodrine 5 mg q8 hrs. MAP goal > 65. Home antihypertensives held at this time.  PULM: MAIRA on CPAP at home, w/ periods of apnea and hypoventilation. Trialed on BiPAP overnight, tolerated well.  GI: Consistent carb diet. No active issues.  RENAL: VIKTORIYA (BUN/Cr downtrending) and rhabdomyolysis (CK downtrending, 4786 to 2626). Acidosis resolved. Monitor lytes. Avoid nephrotoxic agents.  ENDO: Hx of DM type 2. Persistent hyperglycemia resolving, off insulin drip. Lantus 15 mg every morning, Humalog 5 mg before meals, Insulin s/s. Synthyroid 75 mcg daily.  ID: Continue to treat empirically w/ Cefepime and vancomycin. RVP negative, urine culture negative. CXR and pan-CT unrevealing. Afebrile. Lactate downtrending (4.1 to 1.2), WBC count downtrending (28 to 19).  HEME/ONC: DVT prophylaxis 7500 units SQ q8 hrs.  DISPO: Full code. Pt stable for transfer to monitored bed. Pt is a 68 y/o M w/ a PMHx of prostate CA, colon CA s/p resection, L breast CA s/p resection (on Tamoxifen), [place] [facility] [place] ischemic CM s/p BiV ICD, CAD, T2DM, HTN, MAIRA on home CPAP, morbid obesity, who presented to the ED c/o worsening fatigue. Found to be in septic shock 2/2 unknown source, with VIKTORIYA, anion gap metabolic acidosis, rhabdomyolysis. Pt remained hypotensive despite IVF resuscitation and was admitted to the MICU for vasopressor support. Pt developed worsening hyperglycemia requiring insulin gtt. Pt has been off Levophed since 9 PM, on Midodrine 5 mg q8 hrs. LE doppler negative. Tolerated BiPAP overnight. Echo resulted, EF 50-55%. Insulin drip off. At this time, pt is stable for transfer to telemetry. Case discussed w/ ICU attending Dr. Quintero and hospitalist Dr. Sam.    PLAN:    NEURO: No active issues.  CV: Hypotension resolved, weaned off of Levophed. Midodrine 5 mg q8 hrs. MAP goal > 65. Home antihypertensives held at this time.  PULM: MAIRA on CPAP at home, w/ periods of apnea and hypoventilation. Trialed on BiPAP overnight, tolerated well.  GI: Consistent carb diet. No active issues.  RENAL: VIKTORIYA (BUN/Cr downtrending) and rhabdomyolysis (CK downtrending, 4786 to 2626). Acidosis resolved. Monitor lytes. Avoid nephrotoxic agents.  ENDO: Hx of DM type 2. Persistent hyperglycemia resolving, off insulin drip. Lantus 15 mg every morning, Humalog 5 mg before meals, Insulin s/s. Synthyroid 75 mcg daily.  ID: Continue to treat empirically w/ Cefepime and vancomycin. RVP negative, urine culture negative. CXR and pan-CT unrevealing. Afebrile. Lactate downtrending (4.1 to 1.2), WBC count downtrending (28 to 19).  HEME/ONC: DVT prophylaxis 7500 units SQ q8 hrs.  DISPO: Full code. Pt stable for transfer to monitored bed.

## 2019-09-26 NOTE — PHYSICAL THERAPY INITIAL EVALUATION ADULT - IMPAIRMENTS FOUND, PT EVAL
muscle strength/aerobic capacity/endurance/gait, locomotion, and balance/neuromotor development and sensory integration/ROM

## 2019-09-26 NOTE — PROGRESS NOTE ADULT - SUBJECTIVE AND OBJECTIVE BOX
69y old  Male who presents with a chief complaint of Shock (26 Sep 2019 01:25)    BRIEF HOSPITAL COURSE: Pt is a 70 y/o M w/ a PMHx of prostate CA, colon CA s/p resection, L breast CA s/p resection (on Tamoxifen), recently diagnosed multiple myeloma (followed at [facility] [place]), ischemic CM s/p BiV ICD, CAD, T2DM, HTN, MAIRA on home CPAP, morbid obesity, who presented to the ED c/o worsening fatigue. Found to be in septic shock 2/2 unknown source, with VIKTORIYA, anion gap metabolic acidosis, rhabdomyolysis. Pt remained hypotensive despite IVF resuscitation and was admitted to the MICU for vasopressor support. Pt developed worsening hyperglycemia requiring insulin gtt.  Pt has been off Levophed since 9 PM, on Midodrine 5 mg q8 hrs. LE doppler negative. Tolerated BiPAP overnight. Echo resulted, EF 50-55%. Insulin drip off.    Today:   Patient seen and examined at bed side, not in acute distress. He has no acute complaint.     PAST MEDICAL & SURGICAL HISTORY:  Prostate cancer  DM (diabetes mellitus)  HTN (hypertension)  Breast cancer      Medications:  cefepime   IVPB 2000 milliGRAM(s) IV Intermittent <User Schedule>  vancomycin  IVPB 1500 milliGRAM(s) IV Intermittent every 12 hours  midodrine 5 milliGRAM(s) Oral every 8 hours  tamsulosin 0.4 milliGRAM(s) Oral at bedtime  acetaminophen   Tablet .. 650 milliGRAM(s) Oral every 6 hours PRN  tamoxifen 20 milliGRAM(s) Oral daily  aspirin enteric coated 81 milliGRAM(s) Oral daily  heparin  Injectable 7500 Unit(s) SubCutaneous every 8 hours  dextrose 40% Gel 15 Gram(s) Oral once PRN  dextrose 50% Injectable 12.5 Gram(s) IV Push once  dextrose 50% Injectable 25 Gram(s) IV Push once  dextrose 50% Injectable 25 Gram(s) IV Push once  glucagon  Injectable 1 milliGRAM(s) IntraMuscular once PRN  insulin lispro (HumaLOG) corrective regimen sliding scale.   SubCutaneous four times a day with meals  insulin lispro Injectable (HumaLOG) 5 Unit(s) SubCutaneous three times a day before meals  levothyroxine 75 MICROGram(s) Oral daily  cholecalciferol 1000 Unit(s) Oral daily  dextrose 5%. 1000 milliLiter(s) IV Continuous <Continuous>  influenza   Vaccine 0.5 milliLiter(s) IntraMuscular once  chlorhexidine 2% Cloths 1 Application(s) Topical <User Schedule>  nystatin Cream 1 Application(s) Topical two times a day  cyanocobalamin 100 micrograms 2 Tablet(s) 2 Tablet(s) Oral daily      LABS:                        10.0   19.54 )-----------( 159      ( 26 Sep 2019 03:28 )             31.0         137  |  101  |  36.0<H>  ----------------------------<  134<H>  3.8   |  26.0  |  1.34<H>    Ca    8.8      26 Sep 2019 03:28  Phos  3.1       Mg     2.2         TPro  7.3  /  Alb  3.7  /  TBili  0.7  /  DBili  x   /  AST  27  /  ALT  20  /  AlkPhos  69      PT/INR - ( 24 Sep 2019 17:25 )   PT: 14.2 sec;   INR: 1.23 ratio      PTT - ( 24 Sep 2019 17:25 )  PTT:26.3 sec    Urinalysis Basic - ( 24 Sep 2019 19:17 )  Color: Yellow / Appearance: Clear / S.010 / pH: x  Gluc: x / Ketone: Negative  / Bili: Negative / Urobili: Negative mg/dL   Blood: x / Protein: 15 mg/dL / Nitrite: Negative   Leuk Esterase: Negative / RBC: 0-2 /HPF / WBC 0-2   Sq Epi: x / Non Sq Epi: Occasional / Bacteria: Occasional    CULTURES:  Rapid RVP Result: NotDetec (19 @ 20:28)  Culture Results:   No growth (19 @ 19:18)      Physical Examination:  VS: Temp: afebrile, HR: 72, BP: 116/59    General: well groomed, sitting comfortable in chair, not in acute distress     HEENT: PERRL. Symmetric.    PULM: Diminished breath sounds B/L, no significant sputum production.    CVS: Regular rate and rhythm. s1, s2    ABD: Morbidly obese. Soft, nondistended, nontender, BS+    EXT: B/L edema, nontender. Chronic venous stasis changes.    SKIN: Warm and well perfused.    NEURO: Alert and oriented x3, moves all extremities.    Psych: normal mood and affect     RADIOLOGY:     < from: US Duplex Venous Lower Ext Complete, Bilateral (19 @ 13:34) >  EXAM:  US DPLX LWR EXT VEINS COMPL BI                          PROCEDURE DATE:  2019      INTERPRETATION:  CLINICAL INFORMATION: Bilateral calf tenderness.    COMPARISON: None available.    TECHNIQUE: Duplex sonography of the BILATERAL LOWER extremity veins with   color and spectral Doppler, with and without compression. The examination   was technically limited secondary to body habitus.    FINDINGS:    There is normal compressibility of the bilateral common femoral, femoral   and popliteal veins.     Doppler examination shows normal spontaneous and phasic flow.    Calf veins not well seen; tibioperoneal trunk patent and compressible   bilaterally. Calf veins not seen more distally.    IMPRESSION:     Limited evaluation of the calf veins, otherwise no evidence of deep   venous thrombosis in either lower extremity.        < from: CT Abdomen and Pelvis No Cont (19 @ 00:14) >  EXAM:  CT ABDOMEN AND PELVIS                        EXAM:  CT CHEST                          PROCEDURE DATE:  2019      INTERPRETATION:  FINAL REPORT:    PROCEDURE INFORMATION:   Exam: CT Chest Without Contrast   Exam date and time: 2019 11:57 PM   Clinical history: Cough and fever. Renal failure.    TECHNIQUE:   Imaging protocol: Computed tomography of the chest without contrast.   3D rendering: MIP reconstructed images were created and reviewed.     COMPARISON:   Chest x-ray 2019    FINDINGS:   Lungs/airways: Central airways are patent. Linear atelectasis or scarring   at the lung bases. Calcified granulomas in the right middle and lower   lobes. Lungs otherwise clear.  Pleural space: No pleural effusion.  Heart: Normal size. No pericardial effusion.  Vessels: Thoracic aorta normal in caliber Coronary artery calcifications.  Mediastinum/deni: No enlarged lymph nodes.  Chest wall/lower neck: Right chest wall cardiac device with leads in   place. No enlarged axillary lymph nodes.  Bones: No aggressive osseous lesion. Degenerative changes in the spine.    IMPRESSION:     No acute pulmonary disease.    =========================  PROCEDURE INFORMATION:   Exam: CT Abdomen and Pelvis Without Contrast   Exam date and time: 2019 11:57 PM   Clinical history: Cough and fever. Renal failure.    TECHNIQUE:   Imaging protocol: Computed tomography of the abdomen and pelvis without   contrast.   3D rendering: MIP reconstructed images were created and reviewed.     COMPARISON:   CR XR CHEST IMMEDIATE 2019 5:35 PM     FINDINGS:   Limitations: Limited secondary to streak artifact from the patient's   arms, greatest in the upper abdomen, limiting evaluation of the liver,   biliary tree, and pancreas.    Liver: Enlarged. No mass.  Gallbladder and bile ducts: Cholelithiasis. No biliary ductal dilatation.  Pancreas: Limited. Mild fatty infiltration.  Spleen: Normal size. No mass.  Adrenals: Unremarkable.  Kidneys and ureters: Normal size. No hydronephrosis. 1-2 mm   nonobstructing calculus in the mid right kidney.    Stomach and bowel: No bowel obstruction. Surgical sutures are   present along the stomach. Appendix is normal.  Intraperitoneal space: No ascites or free air.  Vasculature: Abdominal aorta normal in caliber with mild calcified plaque.  Retroperitoneum/lymph nodes: No enlarged lymph nodes. Mild infiltration   of the fat at the superior aspect of the left hemipelvis adjacent to the   iliac vasculature (series 3 image 270).    Bladder: Unremarkable.  Reproductive: The prostate is diminutive or absent.  Bones: Degenerative changes in the spine. No aggressive osseous lesion.  Abdominal wall: Diastases of the anterior abdominal wall at the   umbilicus/small fat-containing hernia.    IMPRESSION:    Slightly limited evaluation of the upper abdomen secondary to streak   artifact from patient's arms.    No hydronephrosis.    Mild infiltration of the retroperitoneal fat at the superior aspect of   the left hemipelvis of uncertain etiology, possibly reflecting   inflammation.

## 2019-09-26 NOTE — DIETITIAN INITIAL EVALUATION ADULT. - PERTINENT LABORATORY DATA
09-26 Na137 mmol/L Glu 134 mg/dL<H> K+ 3.8 mmol/L Cr  1.34 mg/dL<H> BUN 36.0 mg/dL<H> Phos 3.1 mg/dL Alb n/a   PAB n/a  HgA1c 6.8%

## 2019-09-26 NOTE — PROGRESS NOTE ADULT - ASSESSMENT
68 y/o M w/ a PMHx of prostate CA, colon CA s/p resection, L breast CA s/p resection (on Tamoxifen), recently diagnosed multiple myeloma (followed at [facility] [place]), ischemic CM s/p BiV ICD, CAD, T2DM, HTN, MAIRA on home CPAP, morbid obesity, who presented to the ED c/o worsening fatigue. Found to be in septic shock 2/2 unknown source, with VIKTORIYA, anion gap metabolic acidosis, rhabdomyolysis. Pt remained hypotensive despite IVF resuscitation and was admitted to the MICU for vasopressor support. Pt developed worsening hyperglycemia requiring insulin gtt.  Pt has been off Levophed since 9 PM, on Midodrine 5 mg q8 hrs. LE doppler negative. Tolerated BiPAP overnight. Echo resulted, EF 50-55%. Insulin drip off.    Septic shock   Off Levophed   Continue Midodrine 5mg tid   Continue antibiotic  Trend WBC    Follow up blood and urine culture   Monitor BP; keep SBP > 90 and MAP >65     VIKTORIYA and Rhabdomyolysis   Cr trending down   CK: trending down   Monitor renal function     DM-2   Off insulin drip  Lantus 15 mg every morning, Humalog 5 mg tid  Insulin sliding scale     HTN   Hold all anti-hypertensive for now due to hypotension  Monitor BP     Hypothyroidism   Continue Synthroid 75 mcg daily    BPH   Tamsulosin 0.4mg     MAIRA   On CPAP at home. Patient was placed on BiPAP overnight and tolerated well  BiPAP PRN     Breast ca-left   S/p mastectomy   Continue Tamoxifen     Supportive  DVT prophylaxis 7500 units SQ q8 hrs  Diet: consistent carbohydrate

## 2019-09-26 NOTE — PHYSICAL THERAPY INITIAL EVALUATION ADULT - ADDITIONAL COMMENTS
as per pt.: lives in the private house with 2 steps (+) rail to enter, uses SAC to ambulate with and negotiates stairs with use of rail and SAC, reports at least 3 falls in the past 3 months, indoor and outdoors, sleeps in the recliner, Son available to assist pt. upon D/C home, pt. is considering ramp over 2 steps to enter, and planing to purchase a scooter as needed

## 2019-09-26 NOTE — DIETITIAN INITIAL EVALUATION ADULT. - PERTINENT MEDS FT
MEDICATIONS  (STANDING):  aspirin enteric coated 81 milliGRAM(s) Oral daily  cefepime   IVPB 2000 milliGRAM(s) IV Intermittent <User Schedule>  cholecalciferol 1000 Unit(s) Oral daily  cyanocobalamin 100 micrograms 2 Tablet(s) 2 Tablet(s) Oral daily  dextrose 5%. 1000 milliLiter(s) (50 mL/Hr) IV Continuous <Continuous>  dextrose 50% Injectable 12.5 Gram(s) IV Push once  dextrose 50% Injectable 25 Gram(s) IV Push once  dextrose 50% Injectable 25 Gram(s) IV Push once  heparin  Injectable 7500 Unit(s) SubCutaneous every 8 hours  influenza   Vaccine 0.5 milliLiter(s) IntraMuscular once  insulin lispro (HumaLOG) corrective regimen sliding scale.   SubCutaneous four times a day with meals  insulin lispro Injectable (HumaLOG) 5 Unit(s) SubCutaneous three times a day before meals  levothyroxine 75 MICROGram(s) Oral daily  midodrine 5 milliGRAM(s) Oral every 8 hours  nystatin Cream 1 Application(s) Topical two times a day  tamoxifen 20 milliGRAM(s) Oral daily  tamsulosin 0.4 milliGRAM(s) Oral at bedtime  vancomycin  IVPB 1500 milliGRAM(s) IV Intermittent every 12 hours    MEDICATIONS  (PRN):  acetaminophen   Tablet .. 650 milliGRAM(s) Oral every 6 hours PRN Mild Pain (1 - 3)  dextrose 40% Gel 15 Gram(s) Oral once PRN Blood Glucose LESS THAN 70 milliGRAM(s)/deciliter  glucagon  Injectable 1 milliGRAM(s) IntraMuscular once PRN Glucose LESS THAN 70 milligrams/deciliter

## 2019-09-26 NOTE — PROGRESS NOTE ADULT - SUBJECTIVE AND OBJECTIVE BOX
Patient is a 69y old  Male who presents with a chief complaint of Shock (26 Sep 2019 01:25)    BRIEF HOSPITAL COURSE: Pt is a 68 y/o M w/ a PMHx of prostate CA, colon CA s/p resection, L breast CA s/p resection (on Tamoxifen), recently diagnosed multiple myeloma (followed at [facility] [place]), ischemic CM s/p BiV ICD, CAD, T2DM, HTN, MAIRA on home CPAP, morbid obesity, who presented to the ED c/o worsening fatigue. Found to be in septic shock 2/2 unknown source, with VIKTORIYA, anion gap metabolic acidosis, rhabdomyolysis. Pt remained hypotensive despite IVF resuscitation and was admitted to the MICU for vasopressor support. Pt developed worsening hyperglycemia requiring insulin gtt.     Events last 24 hours: Pt has been off Levophed since 9 PM, on Midodrine 5 mg q8 hrs. LE doppler negative. Tolerated BiPAP overnight. Echo resulted, EF 50-55%. Insulin drip off. Pt seen and examined at the bedside this morning, w/ complaint of intermittent back pain due to fall a couple weeks ago. Denies chest pain, SOB, N/V/D, abdominal pain.    PAST MEDICAL & SURGICAL HISTORY:  Prostate cancer  DM (diabetes mellitus)  HTN (hypertension)  Breast cancer    Review of Systems:  As reported above.    Medications:  cefepime   IVPB 2000 milliGRAM(s) IV Intermittent <User Schedule>  vancomycin  IVPB 1500 milliGRAM(s) IV Intermittent every 12 hours  midodrine 5 milliGRAM(s) Oral every 8 hours  tamsulosin 0.4 milliGRAM(s) Oral at bedtime  acetaminophen   Tablet .. 650 milliGRAM(s) Oral every 6 hours PRN  tamoxifen 20 milliGRAM(s) Oral daily  aspirin enteric coated 81 milliGRAM(s) Oral daily  heparin  Injectable 7500 Unit(s) SubCutaneous every 8 hours  dextrose 40% Gel 15 Gram(s) Oral once PRN  dextrose 50% Injectable 12.5 Gram(s) IV Push once  dextrose 50% Injectable 25 Gram(s) IV Push once  dextrose 50% Injectable 25 Gram(s) IV Push once  glucagon  Injectable 1 milliGRAM(s) IntraMuscular once PRN  insulin lispro (HumaLOG) corrective regimen sliding scale.   SubCutaneous four times a day with meals  insulin lispro Injectable (HumaLOG) 5 Unit(s) SubCutaneous three times a day before meals  levothyroxine 75 MICROGram(s) Oral daily  cholecalciferol 1000 Unit(s) Oral daily  dextrose 5%. 1000 milliLiter(s) IV Continuous <Continuous>  influenza   Vaccine 0.5 milliLiter(s) IntraMuscular once  chlorhexidine 2% Cloths 1 Application(s) Topical <User Schedule>  nystatin Cream 1 Application(s) Topical two times a day  cyanocobalamin 100 micrograms 2 Tablet(s) 2 Tablet(s) Oral daily    ICU Vital Signs Last 24 Hrs  T(C): 36.9 (26 Sep 2019 08:47), Max: 38.1 (25 Sep 2019 12:55)  T(F): 98.4 (26 Sep 2019 08:47), Max: 100.6 (25 Sep 2019 12:55)  HR: 75 (26 Sep 2019 11:00) (66 - 94)  BP: 100/52 (26 Sep 2019 11:00) (85/49 - 152/63)  BP(mean): 71 (26 Sep 2019 11:00) (62 - 91)  ABP: --  ABP(mean): --  RR: 14 (26 Sep 2019 11:00) (12 - 31)  SpO2: 98% (26 Sep 2019 11:00) (91% - 100%)    I&O's Detail    25 Sep 2019 07:01  -  26 Sep 2019 07:00  --------------------------------------------------------  IN:    insulin regular Infusion: 168.5 mL    norepinephrine Infusion: 80.4 mL    Oral Fluid: 480 mL    Solution: 300 mL    Solution: 100 mL    Solution: 100 mL    Solution: 500 mL    Solution: 50 mL  Total IN: 1778.9 mL    OUT:    Indwelling Catheter - Urethral: 2475 mL  Total OUT: 2475 mL    Total NET: -696.1 mL    26 Sep 2019 07:01  -  26 Sep 2019 11:22  --------------------------------------------------------  IN:    insulin regular Infusion: 12 mL    Solution: 50 mL  Total IN: 62 mL    OUT:    Voided: 600 mL  Total OUT: 600 mL    Total NET: -538 mL    LABS:                        10.0   19.54 )-----------( 159      ( 26 Sep 2019 03:28 )             31.0         137  |  101  |  36.0<H>  ----------------------------<  134<H>  3.8   |  26.0  |  1.34<H>    Ca    8.8      26 Sep 2019 03:28  Phos  3.1       Mg     2.2         TPro  7.3  /  Alb  3.7  /  TBili  0.7  /  DBili  x   /  AST  27  /  ALT  20  /  AlkPhos  69      CARDIAC MARKERS ( 26 Sep 2019 03:28 )  x     / x     / 2626 U/L / x     / 4.5 ng/mL  CARDIAC MARKERS ( 25 Sep 2019 06:02 )  x     / x     / 4786 U/L / x     / 17.4 ng/mL  CARDIAC MARKERS ( 24 Sep 2019 20:32 )  x     / <0.01 ng/mL / x     / x     / x      CARDIAC MARKERS ( 24 Sep 2019 17:25 )  x     / x     / 549 U/L / x     / 2.3 ng/mL    CAPILLARY BLOOD GLUCOSE  160 (26 Sep 2019 07:00)    POCT Blood Glucose.: 167 mg/dL (26 Sep 2019 09:06)    PT/INR - ( 24 Sep 2019 17:25 )   PT: 14.2 sec;   INR: 1.23 ratio      PTT - ( 24 Sep 2019 17:25 )  PTT:26.3 sec    Urinalysis Basic - ( 24 Sep 2019 19:17 )  Color: Yellow / Appearance: Clear / S.010 / pH: x  Gluc: x / Ketone: Negative  / Bili: Negative / Urobili: Negative mg/dL   Blood: x / Protein: 15 mg/dL / Nitrite: Negative   Leuk Esterase: Negative / RBC: 0-2 /HPF / WBC 0-2   Sq Epi: x / Non Sq Epi: Occasional / Bacteria: Occasional    CULTURES:  Rapid RVP Result: NotDetec (19 @ 20:28)  Culture Results:   No growth (19 @ 19:18)    Physical Examination:    General: No acute distress.  Alert, oriented, interactive, nonfocal    HEENT: Pupils equal, reactive to light.  Symmetric.    PULM: Clear to auscultation bilaterally, no significant sputum production    CVS: Regular rate and rhythm, no murmurs, rubs, or gallops    ABD: Soft, nondistended, nontender, normoactive bowel sounds, no masses    EXT: No edema, nontender    SKIN: Warm and well perfused, no rashes noted.    RADIOLOGY: Patient is a 69y old  Male who presents with a chief complaint of Shock (26 Sep 2019 01:25)    BRIEF HOSPITAL COURSE: Pt is a 68 y/o M w/ a PMHx of prostate CA, colon CA s/p resection, L breast CA s/p resection (on Tamoxifen), [place] [facility] [place] CM s/p BiV ICD, CAD, T2DM, HTN, MAIRA on home CPAP, morbid obesity, who presented to the ED c/o worsening fatigue. Found to be in septic shock 2/2 unknown source, with VIKTORIYA, anion gap metabolic acidosis, rhabdomyolysis. Pt remained hypotensive despite IVF resuscitation and was admitted to the MICU for vasopressor support. Pt developed worsening hyperglycemia requiring insulin gtt.     Events last 24 hours: Pt has been off Levophed since 9 PM, on Midodrine 5 mg q8 hrs. LE doppler negative. Tolerated BiPAP overnight. Echo resulted, EF 50-55%. Insulin drip off. Pt seen and examined at the bedside this morning, w/ complaint of intermittent back pain due to fall a couple weeks ago. Denies chest pain, SOB, N/V/D, abdominal pain.    PAST MEDICAL & SURGICAL HISTORY:  Prostate cancer  DM (diabetes mellitus)  HTN (hypertension)  Breast cancer    Review of Systems:  As reported above.    Medications:  cefepime   IVPB 2000 milliGRAM(s) IV Intermittent <User Schedule>  vancomycin  IVPB 1500 milliGRAM(s) IV Intermittent every 12 hours  midodrine 5 milliGRAM(s) Oral every 8 hours  tamsulosin 0.4 milliGRAM(s) Oral at bedtime  acetaminophen   Tablet .. 650 milliGRAM(s) Oral every 6 hours PRN  tamoxifen 20 milliGRAM(s) Oral daily  aspirin enteric coated 81 milliGRAM(s) Oral daily  heparin  Injectable 7500 Unit(s) SubCutaneous every 8 hours  dextrose 40% Gel 15 Gram(s) Oral once PRN  dextrose 50% Injectable 12.5 Gram(s) IV Push once  dextrose 50% Injectable 25 Gram(s) IV Push once  dextrose 50% Injectable 25 Gram(s) IV Push once  glucagon  Injectable 1 milliGRAM(s) IntraMuscular once PRN  insulin lispro (HumaLOG) corrective regimen sliding scale.   SubCutaneous four times a day with meals  insulin lispro Injectable (HumaLOG) 5 Unit(s) SubCutaneous three times a day before meals  levothyroxine 75 MICROGram(s) Oral daily  cholecalciferol 1000 Unit(s) Oral daily  dextrose 5%. 1000 milliLiter(s) IV Continuous <Continuous>  influenza   Vaccine 0.5 milliLiter(s) IntraMuscular once  chlorhexidine 2% Cloths 1 Application(s) Topical <User Schedule>  nystatin Cream 1 Application(s) Topical two times a day  cyanocobalamin 100 micrograms 2 Tablet(s) 2 Tablet(s) Oral daily    ICU Vital Signs Last 24 Hrs  T(C): 36.9 (26 Sep 2019 08:47), Max: 38.1 (25 Sep 2019 12:55)  T(F): 98.4 (26 Sep 2019 08:47), Max: 100.6 (25 Sep 2019 12:55)  HR: 75 (26 Sep 2019 11:00) (66 - 94)  BP: 100/52 (26 Sep 2019 11:00) (85/49 - 152/63)  BP(mean): 71 (26 Sep 2019 11:00) (62 - 91)  ABP: --  ABP(mean): --  RR: 14 (26 Sep 2019 11:00) (12 - 31)  SpO2: 98% (26 Sep 2019 11:00) (91% - 100%)    I&O's Detail    25 Sep 2019 07:01  -  26 Sep 2019 07:00  --------------------------------------------------------  IN:    insulin regular Infusion: 168.5 mL    norepinephrine Infusion: 80.4 mL    Oral Fluid: 480 mL    Solution: 300 mL    Solution: 100 mL    Solution: 100 mL    Solution: 500 mL    Solution: 50 mL  Total IN: 1778.9 mL    OUT:    Indwelling Catheter - Urethral: 2475 mL  Total OUT: 2475 mL    Total NET: -696.1 mL    26 Sep 2019 07:01  -  26 Sep 2019 11:22  --------------------------------------------------------  IN:    insulin regular Infusion: 12 mL    Solution: 50 mL  Total IN: 62 mL    OUT:    Voided: 600 mL  Total OUT: 600 mL    Total NET: -538 mL    LABS:                        10.0   19.54 )-----------( 159      ( 26 Sep 2019 03:28 )             31.0         137  |  101  |  36.0<H>  ----------------------------<  134<H>  3.8   |  26.0  |  1.34<H>    Ca    8.8      26 Sep 2019 03:28  Phos  3.1       Mg     2.2         TPro  7.3  /  Alb  3.7  /  TBili  0.7  /  DBili  x   /  AST  27  /  ALT  20  /  AlkPhos  69      CARDIAC MARKERS ( 26 Sep 2019 03:28 )  x     / x     / 2626 U/L / x     / 4.5 ng/mL  CARDIAC MARKERS ( 25 Sep 2019 06:02 )  x     / x     / 4786 U/L / x     / 17.4 ng/mL  CARDIAC MARKERS ( 24 Sep 2019 20:32 )  x     / <0.01 ng/mL / x     / x     / x      CARDIAC MARKERS ( 24 Sep 2019 17:25 )  x     / x     / 549 U/L / x     / 2.3 ng/mL    CAPILLARY BLOOD GLUCOSE  160 (26 Sep 2019 07:00)    POCT Blood Glucose.: 167 mg/dL (26 Sep 2019 09:06)    PT/INR - ( 24 Sep 2019 17:25 )   PT: 14.2 sec;   INR: 1.23 ratio      PTT - ( 24 Sep 2019 17:25 )  PTT:26.3 sec    Urinalysis Basic - ( 24 Sep 2019 19:17 )  Color: Yellow / Appearance: Clear / S.010 / pH: x  Gluc: x / Ketone: Negative  / Bili: Negative / Urobili: Negative mg/dL   Blood: x / Protein: 15 mg/dL / Nitrite: Negative   Leuk Esterase: Negative / RBC: 0-2 /HPF / WBC 0-2   Sq Epi: x / Non Sq Epi: Occasional / Bacteria: Occasional    CULTURES:  Rapid RVP Result: NotDetec (19 @ 20:28)  Culture Results:   No growth (19 @ 19:18)    Physical Examination:    General: Sitting up in chair, eating breakfast. In no acute distress.    HEENT: PERRL. Symmetric.    PULM: Diminished breath sounds B/L, no significant sputum production.    CVS: Regular rate and rhythm. No murmurs, rubs, or gallops.    ABD: Morbidly obese. Soft, nondistended, nontender, normoactive bowel sounds, no masses.    EXT: B/L edema, nontender. Chronic venous stasis changes.    SKIN: Warm and well perfused.    NEURO: Alert and oriented x3, moves all extremities.    RADIOLOGY:     < from: US Duplex Venous Lower Ext Complete, Bilateral (19 @ 13:34) >  EXAM:  US DPLX LWR EXT VEINS COMPL BI                          PROCEDURE DATE:  2019      INTERPRETATION:  CLINICAL INFORMATION: Bilateral calf tenderness.    COMPARISON: None available.    TECHNIQUE: Duplex sonography of the BILATERAL LOWER extremity veins with   color and spectral Doppler, with and without compression. The examination   was technically limited secondary to body habitus.    FINDINGS:    There is normal compressibility of the bilateral common femoral, femoral   and popliteal veins.     Doppler examination shows normal spontaneous and phasic flow.    Calf veins not well seen; tibioperoneal trunk patent and compressible   bilaterally. Calf veins not seen more distally.    IMPRESSION:     Limited evaluation of the calf veins, otherwise no evidence of deep   venous thrombosis in either lower extremity.    SATURNINO CHIRINOS   This document has been electronically signed. Sep 25 2019  1:45PM    < end of copied text >      < from: CT Abdomen and Pelvis No Cont (19 @ 00:14) >  EXAM:  CT ABDOMEN AND PELVIS                        EXAM:  CT CHEST                          PROCEDURE DATE:  2019      INTERPRETATION:  FINAL REPORT:    PROCEDURE INFORMATION:   Exam: CT Chest Without Contrast   Exam date and time: 2019 11:57 PM   Clinical history: Cough and fever. Renal failure.    TECHNIQUE:   Imaging protocol: Computed tomography of the chest without contrast.   3D rendering: MIP reconstructed images were created and reviewed.     COMPARISON:   Chest x-ray 2019    FINDINGS:   Lungs/airways: Central airways are patent. Linear atelectasis or scarring   at the lung bases. Calcified granulomas in the right middle and lower   lobes. Lungs otherwise clear.  Pleural space: No pleural effusion.  Heart: Normal size. No pericardial effusion.  Vessels: Thoracic aorta normal in caliber Coronary artery calcifications.  Mediastinum/deni: No enlarged lymph nodes.  Chest wall/lower neck: Right chest wall cardiac device with leads in   place. No enlarged axillary lymph nodes.  Bones: No aggressive osseous lesion. Degenerative changes in the spine.    IMPRESSION:     No acute pulmonary disease.    =========================  PROCEDURE INFORMATION:   Exam: CT Abdomen and Pelvis Without Contrast   Exam date and time: 2019 11:57 PM   Clinical history: Cough and fever. Renal failure.    TECHNIQUE:   Imaging protocol: Computed tomography of the abdomen and pelvis without   contrast.   3D rendering: MIP reconstructed images were created and reviewed.     COMPARISON:   CR XR CHEST IMMEDIATE 2019 5:35 PM     FINDINGS:   Limitations: Limited secondary to streak artifact from the patient's   arms, greatest in the upper abdomen, limiting evaluation of the liver,   biliary tree, and pancreas.    Liver: Enlarged. No mass.  Gallbladder and bile ducts: Cholelithiasis. No biliary ductal dilatation.  Pancreas: Limited. Mild fatty infiltration.  Spleen: Normal size. No mass.  Adrenals: Unremarkable.  Kidneys and ureters: Normal size. No hydronephrosis. 1-2 mm   nonobstructing calculus in the mid right kidney.    Stomach and bowel: No bowel obstruction. Surgical sutures are   present along the stomach. Appendix is normal.  Intraperitoneal space: No ascites or free air.  Vasculature: Abdominal aorta normal in caliber with mild calcified plaque.  Retroperitoneum/lymph nodes: No enlarged lymph nodes. Mild infiltration   of the fat at the superior aspect of the left hemipelvis adjacent to the   iliac vasculature (series 3 image 270).    Bladder: Unremarkable.  Reproductive: The prostate is diminutive or absent.  Bones: Degenerative changes in the spine. No aggressive osseous lesion.  Abdominal wall: Diastases of the anterior abdominal wall at the   umbilicus/small fat-containing hernia.    IMPRESSION:    Slightly limited evaluation of the upper abdomen secondary to streak   artifact from patient's arms.    No hydronephrosis.    Mild infiltration of the retroperitoneal fat at the superior aspect of   the left hemipelvis of uncertain etiology, possibly reflecting   inflammation.    SATURNINO CHIRINOS   This document has been electronically signed. Sep 25 2019 10:15AM      < end of copied text > Patient is a 69y old  Male who presents with a chief complaint of Shock (26 Sep 2019 01:25)    BRIEF HOSPITAL COURSE: Pt is a 70 y/o M w/ a PMHx of prostate CA, colon CA s/p resection, L breast CA s/p resection (on Tamoxifen), recently diagnosed multiple myeloma (followed at [facility] [place]), ischemic CM s/p BiV ICD, CAD, T2DM, HTN, MAIRA on home CPAP, morbid obesity, who presented to the ED c/o worsening fatigue. Found to be in septic shock 2/2 unknown source, with VIKTORIYA, anion gap metabolic acidosis, rhabdomyolysis. Pt remained hypotensive despite IVF resuscitation and was admitted to the MICU for vasopressor support. Pt developed worsening hyperglycemia requiring insulin gtt.     Events last 24 hours: Pt has been off Levophed since 9 PM, on Midodrine 5 mg q8 hrs. LE doppler negative. Tolerated BiPAP overnight. Echo resulted, EF 50-55%. Insulin drip off. Pt seen and examined at the bedside this morning, w/ complaint of intermittent back pain due to fall a couple weeks ago. Denies chest pain, SOB, N/V/D, abdominal pain. At this time, pt stable for transfer to telemetry.    PAST MEDICAL & SURGICAL HISTORY:  Prostate cancer  DM (diabetes mellitus)  HTN (hypertension)  Breast cancer    Review of Systems:  As reported above.    Medications:  cefepime   IVPB 2000 milliGRAM(s) IV Intermittent <User Schedule>  vancomycin  IVPB 1500 milliGRAM(s) IV Intermittent every 12 hours  midodrine 5 milliGRAM(s) Oral every 8 hours  tamsulosin 0.4 milliGRAM(s) Oral at bedtime  acetaminophen   Tablet .. 650 milliGRAM(s) Oral every 6 hours PRN  tamoxifen 20 milliGRAM(s) Oral daily  aspirin enteric coated 81 milliGRAM(s) Oral daily  heparin  Injectable 7500 Unit(s) SubCutaneous every 8 hours  dextrose 40% Gel 15 Gram(s) Oral once PRN  dextrose 50% Injectable 12.5 Gram(s) IV Push once  dextrose 50% Injectable 25 Gram(s) IV Push once  dextrose 50% Injectable 25 Gram(s) IV Push once  glucagon  Injectable 1 milliGRAM(s) IntraMuscular once PRN  insulin lispro (HumaLOG) corrective regimen sliding scale.   SubCutaneous four times a day with meals  insulin lispro Injectable (HumaLOG) 5 Unit(s) SubCutaneous three times a day before meals  levothyroxine 75 MICROGram(s) Oral daily  cholecalciferol 1000 Unit(s) Oral daily  dextrose 5%. 1000 milliLiter(s) IV Continuous <Continuous>  influenza   Vaccine 0.5 milliLiter(s) IntraMuscular once  chlorhexidine 2% Cloths 1 Application(s) Topical <User Schedule>  nystatin Cream 1 Application(s) Topical two times a day  cyanocobalamin 100 micrograms 2 Tablet(s) 2 Tablet(s) Oral daily    ICU Vital Signs Last 24 Hrs  T(C): 36.9 (26 Sep 2019 08:47), Max: 38.1 (25 Sep 2019 12:55)  T(F): 98.4 (26 Sep 2019 08:47), Max: 100.6 (25 Sep 2019 12:55)  HR: 75 (26 Sep 2019 11:00) (66 - 94)  BP: 100/52 (26 Sep 2019 11:00) (85/49 - 152/63)  BP(mean): 71 (26 Sep 2019 11:00) (62 - 91)  ABP: --  ABP(mean): --  RR: 14 (26 Sep 2019 11:00) (12 - 31)  SpO2: 98% (26 Sep 2019 11:00) (91% - 100%)    I&O's Detail    25 Sep 2019 07:01  -  26 Sep 2019 07:00  --------------------------------------------------------  IN:    insulin regular Infusion: 168.5 mL    norepinephrine Infusion: 80.4 mL    Oral Fluid: 480 mL    Solution: 300 mL    Solution: 100 mL    Solution: 100 mL    Solution: 500 mL    Solution: 50 mL  Total IN: 1778.9 mL    OUT:    Indwelling Catheter - Urethral: 2475 mL  Total OUT: 2475 mL    Total NET: -696.1 mL    26 Sep 2019 07:01  -  26 Sep 2019 11:22  --------------------------------------------------------  IN:    insulin regular Infusion: 12 mL    Solution: 50 mL  Total IN: 62 mL    OUT:    Voided: 600 mL  Total OUT: 600 mL    Total NET: -538 mL    LABS:                        10.0   19.54 )-----------( 159      ( 26 Sep 2019 03:28 )             31.0         137  |  101  |  36.0<H>  ----------------------------<  134<H>  3.8   |  26.0  |  1.34<H>    Ca    8.8      26 Sep 2019 03:28  Phos  3.1       Mg     2.2         TPro  7.3  /  Alb  3.7  /  TBili  0.7  /  DBili  x   /  AST  27  /  ALT  20  /  AlkPhos  69      CARDIAC MARKERS ( 26 Sep 2019 03:28 )  x     / x     / 2626 U/L / x     / 4.5 ng/mL  CARDIAC MARKERS ( 25 Sep 2019 06:02 )  x     / x     / 4786 U/L / x     / 17.4 ng/mL  CARDIAC MARKERS ( 24 Sep 2019 20:32 )  x     / <0.01 ng/mL / x     / x     / x      CARDIAC MARKERS ( 24 Sep 2019 17:25 )  x     / x     / 549 U/L / x     / 2.3 ng/mL    CAPILLARY BLOOD GLUCOSE  160 (26 Sep 2019 07:00)    POCT Blood Glucose.: 167 mg/dL (26 Sep 2019 09:06)    PT/INR - ( 24 Sep 2019 17:25 )   PT: 14.2 sec;   INR: 1.23 ratio      PTT - ( 24 Sep 2019 17:25 )  PTT:26.3 sec    Urinalysis Basic - ( 24 Sep 2019 19:17 )  Color: Yellow / Appearance: Clear / S.010 / pH: x  Gluc: x / Ketone: Negative  / Bili: Negative / Urobili: Negative mg/dL   Blood: x / Protein: 15 mg/dL / Nitrite: Negative   Leuk Esterase: Negative / RBC: 0-2 /HPF / WBC 0-2   Sq Epi: x / Non Sq Epi: Occasional / Bacteria: Occasional    CULTURES:  Rapid RVP Result: NotDetec (19 @ 20:28)  Culture Results:   No growth (19 @ 19:18)    Physical Examination:    General: Sitting up in chair, eating breakfast. In no acute distress.    HEENT: PERRL. Symmetric.    PULM: Diminished breath sounds B/L, no significant sputum production.    CVS: Regular rate and rhythm. No murmurs, rubs, or gallops.    ABD: Morbidly obese. Soft, nondistended, nontender, normoactive bowel sounds, no masses.    EXT: B/L edema, nontender. Chronic venous stasis changes.    SKIN: Warm and well perfused.    NEURO: Alert and oriented x3, moves all extremities.    RADIOLOGY:     < from: US Duplex Venous Lower Ext Complete, Bilateral (19 @ 13:34) >  EXAM:  US DPLX LWR EXT VEINS COMPL BI                          PROCEDURE DATE:  2019      INTERPRETATION:  CLINICAL INFORMATION: Bilateral calf tenderness.    COMPARISON: None available.    TECHNIQUE: Duplex sonography of the BILATERAL LOWER extremity veins with   color and spectral Doppler, with and without compression. The examination   was technically limited secondary to body habitus.    FINDINGS:    There is normal compressibility of the bilateral common femoral, femoral   and popliteal veins.     Doppler examination shows normal spontaneous and phasic flow.    Calf veins not well seen; tibioperoneal trunk patent and compressible   bilaterally. Calf veins not seen more distally.    IMPRESSION:     Limited evaluation of the calf veins, otherwise no evidence of deep   venous thrombosis in either lower extremity.    SATURNINO CHIRINOS   This document has been electronically signed. Sep 25 2019  1:45PM    < end of copied text >      < from: CT Abdomen and Pelvis No Cont (19 @ 00:14) >  EXAM:  CT ABDOMEN AND PELVIS                        EXAM:  CT CHEST                          PROCEDURE DATE:  2019      INTERPRETATION:  FINAL REPORT:    PROCEDURE INFORMATION:   Exam: CT Chest Without Contrast   Exam date and time: 2019 11:57 PM   Clinical history: Cough and fever. Renal failure.    TECHNIQUE:   Imaging protocol: Computed tomography of the chest without contrast.   3D rendering: MIP reconstructed images were created and reviewed.     COMPARISON:   Chest x-ray 2019    FINDINGS:   Lungs/airways: Central airways are patent. Linear atelectasis or scarring   at the lung bases. Calcified granulomas in the right middle and lower   lobes. Lungs otherwise clear.  Pleural space: No pleural effusion.  Heart: Normal size. No pericardial effusion.  Vessels: Thoracic aorta normal in caliber Coronary artery calcifications.  Mediastinum/deni: No enlarged lymph nodes.  Chest wall/lower neck: Right chest wall cardiac device with leads in   place. No enlarged axillary lymph nodes.  Bones: No aggressive osseous lesion. Degenerative changes in the spine.    IMPRESSION:     No acute pulmonary disease.    =========================  PROCEDURE INFORMATION:   Exam: CT Abdomen and Pelvis Without Contrast   Exam date and time: 2019 11:57 PM   Clinical history: Cough and fever. Renal failure.    TECHNIQUE:   Imaging protocol: Computed tomography of the abdomen and pelvis without   contrast.   3D rendering: MIP reconstructed images were created and reviewed.     COMPARISON:   CR XR CHEST IMMEDIATE 2019 5:35 PM     FINDINGS:   Limitations: Limited secondary to streak artifact from the patient's   arms, greatest in the upper abdomen, limiting evaluation of the liver,   biliary tree, and pancreas.    Liver: Enlarged. No mass.  Gallbladder and bile ducts: Cholelithiasis. No biliary ductal dilatation.  Pancreas: Limited. Mild fatty infiltration.  Spleen: Normal size. No mass.  Adrenals: Unremarkable.  Kidneys and ureters: Normal size. No hydronephrosis. 1-2 mm   nonobstructing calculus in the mid right kidney.    Stomach and bowel: No bowel obstruction. Surgical sutures are   present along the stomach. Appendix is normal.  Intraperitoneal space: No ascites or free air.  Vasculature: Abdominal aorta normal in caliber with mild calcified plaque.  Retroperitoneum/lymph nodes: No enlarged lymph nodes. Mild infiltration   of the fat at the superior aspect of the left hemipelvis adjacent to the   iliac vasculature (series 3 image 270).    Bladder: Unremarkable.  Reproductive: The prostate is diminutive or absent.  Bones: Degenerative changes in the spine. No aggressive osseous lesion.  Abdominal wall: Diastases of the anterior abdominal wall at the   umbilicus/small fat-containing hernia.    IMPRESSION:    Slightly limited evaluation of the upper abdomen secondary to streak   artifact from patient's arms.    No hydronephrosis.    Mild infiltration of the retroperitoneal fat at the superior aspect of   the left hemipelvis of uncertain etiology, possibly reflecting   inflammation.    SATURNINO CHIRINOS   This document has been electronically signed. Sep 25 2019 10:15AM      < end of copied text >

## 2019-09-26 NOTE — PHYSICAL THERAPY INITIAL EVALUATION ADULT - CRITERIA FOR SKILLED THERAPEUTIC INTERVENTIONS
therapy frequency/anticipated equipment needs at discharge/anticipated discharge recommendation/functional limitations in following categories/risk reduction/prevention/rehab potential/impairments found/predicted duration of therapy intervention

## 2019-09-26 NOTE — PROGRESS NOTE ADULT - SUBJECTIVE AND OBJECTIVE BOX
Patient is a 69y old  Male who presents with a chief complaint of Shock (25 Sep 2019 07:51)      BRIEF HOSPITAL COURSE: 68 yo male with PMHx prostate CA, colon CA s/p resection, L breast CA s/p resection on Tamoxifen, recently diagnosed multiple myeloma (followed at [facility] [place]), ischemic CM s/p BiV ICD, CAD, T2DM, HTN, MAIRA on home CPAP, morbid obesity, who presented with worsening fatigue. Found to be in septic shock secondary to unknown source, with VIKTORIYA, anion gap metabolic acidosis, rhabdomyolysis; remained hypotensive despite IVF resuscitation and was admitted to MICU for vasopressor support. Developed worsening hyperglycemia requiring insulin gtt.       Events last 24 hours: Weaned off Norepinephrine, bridged to Midodrine. Remains on insulin gtt. On nocturnal CPAP       PAST MEDICAL & SURGICAL HISTORY:  Prostate cancer  DM (diabetes mellitus)  HTN (hypertension)  Breast cancer        SOCIAL HISTORY:      Review of Systems:  CONSTITUTIONAL: No fever, chills, or fatigue  EYES: No visual disturbances  ENMT:  No difficulty hearing  NECK: No pain  RESPIRATORY: No cough. No shortness of breath  CARDIOVASCULAR: No chest pain, palpitations, or leg swelling  GASTROINTESTINAL: No abdominal pain. No nausea, vomiting, diarrhea, or constipation. No hematemesis, melena or hematochezia  GENITOURINARY: No dysuria, frequency, hematuria, or incontinence  NEUROLOGICAL: No headaches, loss of strength, numbness, or tremors  SKIN: No rashes  MUSCULOSKELETAL: No back or extremity pain. No calf pain  PSYCHIATRIC: No depression, anxiety, or difficulty sleeping    [  ] Due to altered mental status/intubation, subjective information was not able to be obtained from the patient. History was obtained, to the extent possible, from review of the chart and collateral sources of information.      Medications:  cefepime   IVPB 2000 milliGRAM(s) IV Intermittent <User Schedule>  vancomycin  IVPB 1500 milliGRAM(s) IV Intermittent every 24 hours    midodrine 5 milliGRAM(s) Oral every 8 hours  tamsulosin 0.4 milliGRAM(s) Oral at bedtime      acetaminophen   Tablet .. 650 milliGRAM(s) Oral every 6 hours PRN    tamoxifen 20 milliGRAM(s) Oral daily    aspirin enteric coated 81 milliGRAM(s) Oral daily  heparin  Injectable 7500 Unit(s) SubCutaneous every 8 hours        dextrose 40% Gel 15 Gram(s) Oral once PRN  dextrose 50% Injectable 12.5 Gram(s) IV Push once  dextrose 50% Injectable 25 Gram(s) IV Push once  dextrose 50% Injectable 25 Gram(s) IV Push once  glucagon  Injectable 1 milliGRAM(s) IntraMuscular once PRN  insulin regular Infusion 4 Unit(s)/Hr IV Continuous <Continuous>  levothyroxine 75 MICROGram(s) Oral daily    cholecalciferol 1000 Unit(s) Oral daily  dextrose 5%. 1000 milliLiter(s) IV Continuous <Continuous>    influenza   Vaccine 0.5 milliLiter(s) IntraMuscular once    chlorhexidine 2% Cloths 1 Application(s) Topical <User Schedule>  nystatin Cream 1 Application(s) Topical two times a day    cyanocobalamin 100 micrograms 2 Tablet(s) 2 Tablet(s) Oral daily          ICU Vital Signs Last 24 Hrs  T(C): 37.7 (25 Sep 2019 23:32), Max: 38.1 (25 Sep 2019 07:55)  T(F): 99.9 (25 Sep 2019 23:32), Max: 100.6 (25 Sep 2019 07:55)  HR: 81 (26 Sep 2019 01:00) (74 - 94)  BP: 102/60 (26 Sep 2019 01:00) (78/46 - 128/58)  BP(mean): 76 (26 Sep 2019 01:00) (56 - 84)  ABP: --  ABP(mean): --  RR: 24 (26 Sep 2019 01:00) (14 - 31)  SpO2: 99% (26 Sep 2019 01:00) (91% - 100%)          I&O's Detail    24 Sep 2019 07:01  -  25 Sep 2019 07:00  --------------------------------------------------------  IN:    multiple electrolytes Injection Type 1multiple electrolytes Injection Type 1: 200 mL    norepinephrine Infusion: 91 mL    Oral Fluid: 250 mL    Solution: 150 mL    Solution: 500 mL    Solution: 100 mL  Total IN: 1291 mL    OUT:    Indwelling Catheter - Urethral: 1250 mL  Total OUT: 1250 mL    Total NET: 41 mL      25 Sep 2019 07:01  -  26 Sep 2019 01:25  --------------------------------------------------------  IN:    insulin regular Infusion: 134.5 mL    norepinephrine Infusion: 80.4 mL    Oral Fluid: 480 mL    Solution: 300 mL    Solution: 100 mL    Solution: 100 mL    Solution: 50 mL  Total IN: 1244.9 mL    OUT:    Indwelling Catheter - Urethral: 2015 mL  Total OUT: 2015 mL    Total NET: -770.1 mL            LABS:                        11.1   28.47 )-----------( 208      ( 25 Sep 2019 06:02 )             33.8         133<L>  |  96<L>  |  41.0<H>  ----------------------------<  245<H>  5.5<H>   |  22.0  |  1.84<H>    Ca    8.5<L>      25 Sep 2019 06:02  Mg     1.6         TPro  7.3  /  Alb  3.7  /  TBili  0.7  /  DBili  x   /  AST  27  /  ALT  20  /  AlkPhos  69  0924      CARDIAC MARKERS ( 25 Sep 2019 06:02 )  x     / x     / 4786 U/L / x     / 17.4 ng/mL  CARDIAC MARKERS ( 24 Sep 2019 20:32 )  x     / <0.01 ng/mL / x     / x     / x      CARDIAC MARKERS ( 24 Sep 2019 17:25 )  x     / x     / 549 U/L / x     / 2.3 ng/mL      CAPILLARY BLOOD GLUCOSE  135 (26 Sep 2019 01:00)      POCT Blood Glucose.: 135 mg/dL (26 Sep 2019 01:05)    PT/INR - ( 24 Sep 2019 17:25 )   PT: 14.2 sec;   INR: 1.23 ratio         PTT - ( 24 Sep 2019 17:25 )  PTT:26.3 sec  Urinalysis Basic - ( 24 Sep 2019 19:17 )    Color: Yellow / Appearance: Clear / S.010 / pH: x  Gluc: x / Ketone: Negative  / Bili: Negative / Urobili: Negative mg/dL   Blood: x / Protein: 15 mg/dL / Nitrite: Negative   Leuk Esterase: Negative / RBC: 0-2 /HPF / WBC 0-2   Sq Epi: x / Non Sq Epi: Occasional / Bacteria: Occasional      CULTURES:  Rapid RVP Result: NotDetec ( @ 20:28)  Culture Results:   No growth ( @ 19:18)        Physical Examination:    General: No acute distress.      HEENT: Pupils equal, reactive to light.  Symmetric.    PULM: Clear to auscultation bilaterally, no significant sputum production    CVS: Regular rate and rhythm, no murmurs, rubs, or gallops    ABD: Soft, nondistended, nontender, normoactive bowel sounds, no masses    EXT: No edema, nontender    SKIN: Warm and well perfused, no rashes noted.    NEURO: Alert, oriented, interactive, nonfocal        RADIOLOGY: ***    INVASIVE LINES:  INDWELLING DODGE:  VTE PROPHYLAXIS:  CAM ICU:  CODE STATUS:    CRITICAL CARE TIME SPENT: *** minutes spent performing frequent bedside reassessments and augmenting plan of care to address problems of acute critical illness that pose high probability of life threatening deterioration and/or end organ damage/dysfunction and discussing goals of care with patient/family, non-inclusive of time spent on procedures performed. Patient is a 69y old  Male who presents with a chief complaint of Shock (25 Sep 2019 07:51)      BRIEF HOSPITAL COURSE: 68 yo male with PMHx prostate CA, colon CA s/p resection, L breast CA s/p resection on Tamoxifen, recently diagnosed multiple myeloma (followed at [facility] [place] ischemic CM s/p BiV ICD, CAD, T2DM, HTN, MAIRA on home CPAP, morbid obesity, who presented with worsening fatigue. Found to be in septic shock secondary to unknown source, with VIKTORIYA, anion gap metabolic acidosis, rhabdomyolysis; remained hypotensive despite IVF resuscitation and was admitted to MICU for vasopressor support. Developed worsening hyperglycemia requiring insulin gtt.       Events last 24 hours: Weaned off Norepinephrine, bridged to Midodrine. Remains on insulin gtt. On nocturnal CPAP with periods of apnea despite titrating up CPAP, now on trial of BiPAP      PAST MEDICAL & SURGICAL HISTORY:  Prostate cancer  DM (diabetes mellitus)  HTN (hypertension)  Breast cancer      SOCIAL HISTORY: Former smoker      Review of Systems:  CONSTITUTIONAL: No fever, chills, or fatigue  EYES: No visual disturbances  ENMT:  No difficulty hearing  NECK: No pain  RESPIRATORY: No cough. No shortness of breath  CARDIOVASCULAR: No chest pain, palpitations, or leg swelling  GASTROINTESTINAL: No abdominal pain. No nausea, vomiting, diarrhea, or constipation. No hematemesis, melena or hematochezia  GENITOURINARY: No dysuria, frequency, hematuria, or incontinence  NEUROLOGICAL: No headaches, loss of strength, numbness, or tremors  SKIN: No rashes  MUSCULOSKELETAL: No back or extremity pain. No calf pain  PSYCHIATRIC: No depression, anxiety, or difficulty sleeping      Medications:  cefepime   IVPB 2000 milliGRAM(s) IV Intermittent <User Schedule>  vancomycin  IVPB 1500 milliGRAM(s) IV Intermittent every 24 hours  midodrine 5 milliGRAM(s) Oral every 8 hours  tamsulosin 0.4 milliGRAM(s) Oral at bedtime  acetaminophen   Tablet .. 650 milliGRAM(s) Oral every 6 hours PRN  tamoxifen 20 milliGRAM(s) Oral daily  aspirin enteric coated 81 milliGRAM(s) Oral daily  heparin  Injectable 7500 Unit(s) SubCutaneous every 8 hours  dextrose 40% Gel 15 Gram(s) Oral once PRN  dextrose 50% Injectable 12.5 Gram(s) IV Push once  dextrose 50% Injectable 25 Gram(s) IV Push once  dextrose 50% Injectable 25 Gram(s) IV Push once  glucagon  Injectable 1 milliGRAM(s) IntraMuscular once PRN  insulin regular Infusion 4 Unit(s)/Hr IV Continuous <Continuous>  levothyroxine 75 MICROGram(s) Oral daily  cholecalciferol 1000 Unit(s) Oral daily  dextrose 5%. 1000 milliLiter(s) IV Continuous <Continuous>  influenza   Vaccine 0.5 milliLiter(s) IntraMuscular once  chlorhexidine 2% Cloths 1 Application(s) Topical <User Schedule>  nystatin Cream 1 Application(s) Topical two times a day  cyanocobalamin 100 micrograms 2 Tablet(s) 2 Tablet(s) Oral daily        ICU Vital Signs Last 24 Hrs  T(C): 37.7 (25 Sep 2019 23:32), Max: 38.1 (25 Sep 2019 07:55)  T(F): 99.9 (25 Sep 2019 23:32), Max: 100.6 (25 Sep 2019 07:55)  HR: 81 (26 Sep 2019 01:00) (74 - 94)  BP: 102/60 (26 Sep 2019 01:00) (78/46 - 128/58)  BP(mean): 76 (26 Sep 2019 01:00) (56 - 84)  ABP: --  ABP(mean): --  RR: 24 (26 Sep 2019 01:00) (14 - 31)  SpO2: 99% (26 Sep 2019 01:00) (91% - 100%)        I&O's Detail    24 Sep 2019 07:01  -  25 Sep 2019 07:00  --------------------------------------------------------  IN:    multiple electrolytes Injection Type 1multiple electrolytes Injection Type 1: 200 mL    norepinephrine Infusion: 91 mL    Oral Fluid: 250 mL    Solution: 150 mL    Solution: 500 mL    Solution: 100 mL  Total IN: 1291 mL    OUT:    Indwelling Catheter - Urethral: 1250 mL  Total OUT: 1250 mL    Total NET: 41 mL      25 Sep 2019 07:01  -  26 Sep 2019 01:25  --------------------------------------------------------  IN:    insulin regular Infusion: 134.5 mL    norepinephrine Infusion: 80.4 mL    Oral Fluid: 480 mL    Solution: 300 mL    Solution: 100 mL    Solution: 100 mL    Solution: 50 mL  Total IN: 1244.9 mL    OUT:    Indwelling Catheter - Urethral: 2015 mL  Total OUT: 2015 mL    Total NET: -770.1 mL        LABS:                        11.1   28.47 )-----------( 208      ( 25 Sep 2019 06:02 )             33.8         133<L>  |  96<L>  |  41.0<H>  ----------------------------<  245<H>  5.5<H>   |  22.0  |  1.84<H>    Ca    8.5<L>      25 Sep 2019 06:02  Mg     1.6         TPro  7.3  /  Alb  3.7  /  TBili  0.7  /  DBili  x   /  AST  27  /  ALT  20  /  AlkPhos  69        CARDIAC MARKERS ( 25 Sep 2019 06:02 )  x     / x     / 4786 U/L / x     / 17.4 ng/mL  CARDIAC MARKERS ( 24 Sep 2019 20:32 )  x     / <0.01 ng/mL / x     / x     / x      CARDIAC MARKERS ( 24 Sep 2019 17:25 )  x     / x     / 549 U/L / x     / 2.3 ng/mL      CAPILLARY BLOOD GLUCOSE  135 (26 Sep 2019 01:00)      POCT Blood Glucose.: 135 mg/dL (26 Sep 2019 01:05)    PT/INR - ( 24 Sep 2019 17:25 )   PT: 14.2 sec;   INR: 1.23 ratio         PTT - ( 24 Sep 2019 17:25 )  PTT:26.3 sec  Urinalysis Basic - ( 24 Sep 2019 19:17 )    Color: Yellow / Appearance: Clear / S.010 / pH: x  Gluc: x / Ketone: Negative  / Bili: Negative / Urobili: Negative mg/dL   Blood: x / Protein: 15 mg/dL / Nitrite: Negative   Leuk Esterase: Negative / RBC: 0-2 /HPF / WBC 0-2   Sq Epi: x / Non Sq Epi: Occasional / Bacteria: Occasional      CULTURES:  Rapid RVP Result: NotDetec ( @ 20:28)  Culture Results:   No growth ( @ 19:18)        Physical Examination:    General: No acute distress.      HEENT: Pupils equal, reactive to light.  Symmetric.    PULM: Diminished to auscultation bilaterally, no significant sputum production    CVS: Regular rate and rhythm, no murmurs, rubs, or gallops    ABD: Morbidly obese. Soft, nondistended, nontender, normoactive bowel sounds, no masses. Well healed upper abdominal scar.    EXT: b/l LE pitting edema with chronic venous stasis changes, small right lateral le wound    SKIN: Warm and well perfused, as above    NEURO: Alert, oriented, interactive, nonfocal        RADIOLOGY: < from: CT Abdomen and Pelvis No Cont (19 @ 00:14) >     EXAM:  CT ABDOMEN AND PELVIS                         EXAM:  CT CHEST                          PROCEDURE DATE:  2019          INTERPRETATION:  FINAL REPORT:    PROCEDURE INFORMATION:   Exam: CT Chest Without Contrast   Exam date and time: 2019 11:57 PM   Clinical history: Cough and fever. Renal failure.    TECHNIQUE:   Imaging protocol: Computed tomography of the chest without contrast.   3D rendering: MIP reconstructed images were created and reviewed.     COMPARISON:   Chest x-ray 2019    FINDINGS:   Lungs/airways: Central airways are patent. Linear atelectasis or scarring   at the lung bases. Calcified granulomas in the right middle and lower   lobes. Lungs otherwise clear.  Pleural space: No pleural effusion.  Heart: Normal size. No pericardial effusion.  Vessels: Thoracic aorta normal in caliber Coronary artery calcifications.  Mediastinum/deni: No enlarged lymph nodes.  Chest wall/lower neck: Right chest wall cardiac device with leads in   place. No enlarged axillary lymph nodes.  Bones: No aggressive osseous lesion. Degenerative changes in the spine.    IMPRESSION:     No acute pulmonary disease.      =========================  PROCEDURE INFORMATION:   Exam: CT Abdomen and Pelvis Without Contrast   Exam date and time: 2019 11:57 PM   Clinical history: Cough and fever. Renal failure.    TECHNIQUE:   Imaging protocol: Computed tomography of the abdomen and pelvis without   contrast.   3D rendering: MIP reconstructed images were created and reviewed.     COMPARISON:   CR XR CHEST IMMEDIATE 2019 5:35 PM     FINDINGS:   Limitations: Limited secondary to streak artifact from the patient's   arms, greatest in the upper abdomen, limiting evaluation of the liver,   biliary tree, and pancreas.    Liver: Enlarged. No mass.  Gallbladder and bile ducts: Cholelithiasis. No biliary ductal dilatation.  Pancreas: Limited. Mild fatty infiltration.  Spleen: Normal size. No mass.  Adrenals: Unremarkable.  Kidneys and ureters: Normal size. No hydronephrosis. 1-2 mm   nonobstructing calculus in the mid right kidney.    Stomach and bowel: No bowel obstruction. Surgical sutures are   present along the stomach. Appendix is normal.  Intraperitoneal space: No ascites or free air.  Vasculature: Abdominal aorta normal in caliber with mild calcified plaque.  Retroperitoneum/lymph nodes: No enlarged lymph nodes. Mild infiltration   of the fat at the superior aspect of the left hemipelvis adjacent to the   iliac vasculature (series 3 image 270).    Bladder: Unremarkable.  Reproductive: The prostate is diminutive or absent.  Bones: Degenerative changes in the spine. No aggressive osseous lesion.  Abdominal wall: Diastases of the anterior abdominal wall at the   umbilicus/small fat-containing hernia.    IMPRESSION:    Slightly limited evaluation of the upper abdomen secondary to streak   artifact from patient's arms.    No hydronephrosis.    Mild infiltration of the retroperitoneal fat at the superior aspect of   the left hemipelvis of uncertain etiology, possibly reflecting   inflammation.    SATURNINO CHIRINOS   This document has been electronically signed. Sep 25 2019 10:15AM      INVASIVE LINES:   INDWELLING DODGE: Y   VTE PROPHYLAXIS: Heparin SC   CAM ICU: -   CODE STATUS: FULL    CRITICAL CARE TIME SPENT: 32 minutes spent performing frequent bedside reassessments and augmenting plan of care to address problems of acute critical illness that pose high probability of life threatening deterioration and/or end organ damage/dysfunction and discussing goals of care with patient, non-inclusive of time spent on procedures performed.

## 2019-09-26 NOTE — PROGRESS NOTE ADULT - ASSESSMENT
68 yo male with PMHx prostate CA, colon CA s/p resection, L breast CA s/p resection on Tamoxifen, recently diagnosed multiple myeloma (followed at [facility] [place] ischemic CM s/p BiV ICD, CAD, T2DM, HTN, MAIRA on home CPAP, morbid obesity, who presented with worsening fatigue. Found to be in septic shock secondary to unknown source, with VIKTORIYA, anion gap metabolic acidosis, rhabdomyolysis; remained hypotensive despite IVF resuscitation and was admitted to MICU for vasopressor support. Developed worsening hyperglycemia requiring insulin gtt.       Shock resolving, weaned off Norepinephrine started on Midodrine 5mg TID.     However MAP borderline, at times not at goal, may titrate up Midodrine to maintain MAP goal >65.     Monitoring end points of perfusion.    Source of sepsis unknown. Pan-CT unrevealing, UCx negative, RVP negative. Blood cultures pending.     Persistent lactemia >3, will repeat lactate level. Trend PCT level    Trending fever curve, leukocytosis.     Continue empiric antibiotics with Cefepime and Vancomycin, will check vanc trough in AM and adjust dose by level    VIKTORIYA and rhabdomyolysis likely related to severe sepsis, improving ? underlying CKD. will repeat CPK level in AM    Uncontrolled hyperglycemia requiring insulin gtt. A1c = 6.8. As shock is resolving, will d/c Marik protocol to obtain better glycemic control 68 yo male with PMHx prostate CA, colon CA s/p resection, L breast CA s/p resection on Tamoxifen, [place] [facility] [place] CM s/p BiV ICD, CAD, T2DM, HTN, MAIRA on home CPAP, morbid obesity, who presented with worsening fatigue. Found to be in septic shock secondary to unknown source, with VIKTORIYA, anion gap metabolic acidosis, rhabdomyolysis; remained hypotensive despite IVF resuscitation and was admitted to MICU for vasopressor support. Developed worsening hyperglycemia requiring insulin gtt.       Shock resolving, weaned off Norepinephrine started on Midodrine 5mg TID.     However MAP borderline, at times not at goal, may titrate up Midodrine to maintain MAP goal >65.     Monitoring end points of perfusion.    Source of sepsis unknown. Pan-CT unrevealing, UCx negative, RVP negative. Blood cultures pending.     Persistent lactemia >3, will repeat lactate level. Trend PCT level    Trending fever curve, leukocytosis.     Continue empiric antibiotics with Cefepime and Vancomycin, will check vanc trough in AM and adjust dose by level    VIKTORIYA and rhabdomyolysis likely related to severe sepsis, improving ? underlying CKD. will repeat CPK level in AM    Uncontrolled hyperglycemia requiring insulin gtt. A1c = 6.8. As shock is resolving, will d/c Marik protocol to obtain better glycemic control    MAIRA on CPAP with periods of apnea and hypoventilation, augmenting NIV settings and trialing on BiPAP

## 2019-09-26 NOTE — DIETITIAN INITIAL EVALUATION ADULT. - OTHER INFO
69 year old male PMHx of prostate CA, colon CA s/p resection, L breast CA s/p resection, recently diagnosed multiple myeloma, ischemic CM s/p BiV ICD, CAD, T2DM, HTN, MAIRA on home CPAP, morbid obesity, presents with c/o worsening fatigue, found to be in septic shock 2/2 unknown source, with VIKTORIYA, anion gap metabolic acidosis, and rhabdomyolysis. Pt reports good appetite/po intake PTA and currently. Reports he occasionally checks blood sugars at home and monitors what he eats. Denies any weight changes over the last year. Provided with meal planning with plate method diet education (verbal and written literature).

## 2019-09-27 LAB
ANION GAP SERPL CALC-SCNC: 14 MMOL/L — SIGNIFICANT CHANGE UP (ref 5–17)
BUN SERPL-MCNC: 32 MG/DL — HIGH (ref 8–20)
CALCIUM SERPL-MCNC: 8.8 MG/DL — SIGNIFICANT CHANGE UP (ref 8.6–10.2)
CHLORIDE SERPL-SCNC: 98 MMOL/L — SIGNIFICANT CHANGE UP (ref 98–107)
CK MB CFR SERPL CALC: 2.2 NG/ML — SIGNIFICANT CHANGE UP (ref 0–6.7)
CK SERPL-CCNC: 1053 U/L — HIGH (ref 30–200)
CO2 SERPL-SCNC: 22 MMOL/L — SIGNIFICANT CHANGE UP (ref 22–29)
CREAT SERPL-MCNC: 1.02 MG/DL — SIGNIFICANT CHANGE UP (ref 0.5–1.3)
GLUCOSE BLDC GLUCOMTR-MCNC: 162 MG/DL — HIGH (ref 70–99)
GLUCOSE BLDC GLUCOMTR-MCNC: 182 MG/DL — HIGH (ref 70–99)
GLUCOSE BLDC GLUCOMTR-MCNC: 206 MG/DL — HIGH (ref 70–99)
GLUCOSE BLDC GLUCOMTR-MCNC: 310 MG/DL — HIGH (ref 70–99)
GLUCOSE SERPL-MCNC: 157 MG/DL — HIGH (ref 70–115)
HCT VFR BLD CALC: 33.4 % — LOW (ref 39–50)
HGB BLD-MCNC: 10.8 G/DL — LOW (ref 13–17)
MAGNESIUM SERPL-MCNC: 2.2 MG/DL — SIGNIFICANT CHANGE UP (ref 1.6–2.6)
MCHC RBC-ENTMCNC: 32 PG — SIGNIFICANT CHANGE UP (ref 27–34)
MCHC RBC-ENTMCNC: 32.3 GM/DL — SIGNIFICANT CHANGE UP (ref 32–36)
MCV RBC AUTO: 99.1 FL — SIGNIFICANT CHANGE UP (ref 80–100)
PHOSPHATE SERPL-MCNC: 2.4 MG/DL — SIGNIFICANT CHANGE UP (ref 2.4–4.7)
PLATELET # BLD AUTO: 181 K/UL — SIGNIFICANT CHANGE UP (ref 150–400)
POTASSIUM SERPL-MCNC: 4.1 MMOL/L — SIGNIFICANT CHANGE UP (ref 3.5–5.3)
POTASSIUM SERPL-SCNC: 4.1 MMOL/L — SIGNIFICANT CHANGE UP (ref 3.5–5.3)
PROCALCITONIN SERPL-MCNC: 11.68 NG/ML — HIGH (ref 0.02–0.1)
RBC # BLD: 3.37 M/UL — LOW (ref 4.2–5.8)
RBC # FLD: 14.9 % — HIGH (ref 10.3–14.5)
SODIUM SERPL-SCNC: 134 MMOL/L — LOW (ref 135–145)
WBC # BLD: 9.24 K/UL — SIGNIFICANT CHANGE UP (ref 3.8–10.5)
WBC # FLD AUTO: 9.24 K/UL — SIGNIFICANT CHANGE UP (ref 3.8–10.5)

## 2019-09-27 PROCEDURE — 99233 SBSQ HOSP IP/OBS HIGH 50: CPT

## 2019-09-27 PROCEDURE — 99223 1ST HOSP IP/OBS HIGH 75: CPT

## 2019-09-27 RX ORDER — POLYETHYLENE GLYCOL 3350 17 G/17G
17 POWDER, FOR SOLUTION ORAL ONCE
Refills: 0 | Status: COMPLETED | OUTPATIENT
Start: 2019-09-27 | End: 2019-09-27

## 2019-09-27 RX ORDER — MULTIVIT WITH MIN/MFOLATE/K2 340-15/3 G
1 POWDER (GRAM) ORAL ONCE
Refills: 0 | Status: COMPLETED | OUTPATIENT
Start: 2019-09-27 | End: 2019-09-27

## 2019-09-27 RX ADMIN — Medication 300 MILLIGRAM(S): at 17:30

## 2019-09-27 RX ADMIN — Medication 5 UNIT(S): at 08:30

## 2019-09-27 RX ADMIN — HEPARIN SODIUM 7500 UNIT(S): 5000 INJECTION INTRAVENOUS; SUBCUTANEOUS at 06:46

## 2019-09-27 RX ADMIN — CEFEPIME 100 MILLIGRAM(S): 1 INJECTION, POWDER, FOR SOLUTION INTRAMUSCULAR; INTRAVENOUS at 11:47

## 2019-09-27 RX ADMIN — Medication 4: at 12:23

## 2019-09-27 RX ADMIN — Medication 5 UNIT(S): at 12:24

## 2019-09-27 RX ADMIN — Medication 81 MILLIGRAM(S): at 08:32

## 2019-09-27 RX ADMIN — Medication 8: at 21:57

## 2019-09-27 RX ADMIN — TAMOXIFEN CITRATE 20 MILLIGRAM(S): 20 TABLET, FILM COATED ORAL at 08:31

## 2019-09-27 RX ADMIN — TAMSULOSIN HYDROCHLORIDE 0.4 MILLIGRAM(S): 0.4 CAPSULE ORAL at 22:47

## 2019-09-27 RX ADMIN — CEFEPIME 100 MILLIGRAM(S): 1 INJECTION, POWDER, FOR SOLUTION INTRAMUSCULAR; INTRAVENOUS at 22:47

## 2019-09-27 RX ADMIN — Medication 2: at 08:30

## 2019-09-27 RX ADMIN — Medication 2: at 17:23

## 2019-09-27 RX ADMIN — MIDODRINE HYDROCHLORIDE 5 MILLIGRAM(S): 2.5 TABLET ORAL at 17:26

## 2019-09-27 RX ADMIN — HEPARIN SODIUM 7500 UNIT(S): 5000 INJECTION INTRAVENOUS; SUBCUTANEOUS at 23:37

## 2019-09-27 RX ADMIN — Medication 75 MICROGRAM(S): at 06:40

## 2019-09-27 RX ADMIN — Medication 1000 UNIT(S): at 08:32

## 2019-09-27 RX ADMIN — NYSTATIN CREAM 1 APPLICATION(S): 100000 CREAM TOPICAL at 17:25

## 2019-09-27 RX ADMIN — INSULIN GLARGINE 15 UNIT(S): 100 INJECTION, SOLUTION SUBCUTANEOUS at 08:34

## 2019-09-27 RX ADMIN — MIDODRINE HYDROCHLORIDE 5 MILLIGRAM(S): 2.5 TABLET ORAL at 06:40

## 2019-09-27 RX ADMIN — MIDODRINE HYDROCHLORIDE 5 MILLIGRAM(S): 2.5 TABLET ORAL at 22:47

## 2019-09-27 RX ADMIN — Medication 300 MILLIGRAM(S): at 09:25

## 2019-09-27 RX ADMIN — Medication 5 UNIT(S): at 17:24

## 2019-09-27 RX ADMIN — Medication 650 MILLIGRAM(S): at 23:36

## 2019-09-27 RX ADMIN — NYSTATIN CREAM 1 APPLICATION(S): 100000 CREAM TOPICAL at 06:41

## 2019-09-27 RX ADMIN — HEPARIN SODIUM 7500 UNIT(S): 5000 INJECTION INTRAVENOUS; SUBCUTANEOUS at 17:26

## 2019-09-27 NOTE — PROGRESS NOTE ADULT - ASSESSMENT
70 y/o M w/ a PMHx of prostate CA, colon CA s/p resection, L breast CA s/p resection (on Tamoxifen), recently diagnosed multiple myeloma (followed at [facility] [place] ischemic CM s/p BiV ICD, CAD, T2DM, HTN, MAIRA on home CPAP, morbid obesity, who presented to the ED c/o worsening fatigue. Found to be in septic shock 2/2 unknown source, with VIKTORIYA, anion gap metabolic acidosis, rhabdomyolysis. Pt remained hypotensive despite IVF resuscitation and was admitted to the MICU for vasopressor support. Pt developed worsening hyperglycemia requiring insulin gtt.  Pt is now off levophed and on Midodrine 5 mg q8 hrs. LE doppler negative. Tolerated BiPAP overnight. Echo resulted, EF 50-55%. Insulin drip off.    Septic shock   unclear source  Continue Midodrine 5mg tid   Continue antibiotic  all cultures negative  --> ID consulted today     VIKTORIYA and Rhabdomyolysis   improved  Monitor renal function     DM-2   Off insulin drip  Lantus 15 mg every morning, Humalog 5 mg tid  Insulin sliding scale     HTN   Hold all anti-hypertensive for now due to hypotension  Monitor BP     Hypothyroidism   Continue Synthroid 75 mcg daily    BPH   Tamsulosin 0.4mg     MAIRA   On CPAP at home. Patient was placed on BiPAP overnight and tolerated well  BiPAP PRN     Breast ca-left   S/p mastectomy   Continue Tamoxifen     Supportive  DVT prophylaxis 7500 units SQ q8 hrs  Diet: consistent carbohydrate     dispo - eventual dc home

## 2019-09-27 NOTE — CONSULT NOTE ADULT - ASSESSMENT
69 morbidly obese male w/ PMHx DM, hypothyroid, CAD, ICM/ HFrEF s/p BiVICD, PVD, remote prostate CA, Colon CA s/p resection, L breast CA s/p mastectomy (on Tamoxifen-currently being monitored) and recently diagnosed w/ MM ( f/u at [facility] [place], currently being monitored) presented to the ED with fatigue. He supposedly could not get out of his car b/c of fatigue. Denies any chest pain, abd pain, N/V/D/C/fever/chills/dysuria or cough. H/o recurrent falls, last one about a week back when he had a head wound. Also endorses chronic back pain. In ED today he was found to be in shock, BP   74/47mmHG w/ T max 102.7 and lactate 5.3. Received a bolus ~ 3.5L and was started on Levo gtt. On [facility] eval pt was awake, sitting up in bed and did not have any new complaints    Overall CHF, MM, s/p septic shock ? from cellultiis    - Blood cultures NGTD  - CT c/a/p no clear source  - ? if from LE cellulitis-does recall he had > LE swelling and redness prior to arrival-check CT LLE  - Has been having worsening back pain since fall 2 weeks ago-check CT LS spine  - Procalcitonin level elevated  - Continue cefepime and vancomycin for now  - Trend Fever  - Trend Leukocytosis      Will Follow

## 2019-09-27 NOTE — PROGRESS NOTE ADULT - SUBJECTIVE AND OBJECTIVE BOX
SRAVANI FRZAIER    [id number]    69y      Male    INTERVAL HPI/OVERNIGHT EVENTS:    patient being seen for septic shock of unclear origin. Patient seen at bedside and denies any complaints and states feeling well    REVIEW OF SYSTEMS:    CONSTITUTIONAL: No fever, weight loss, or fatigue  RESPIRATORY: No cough, wheezing, hemoptysis; No shortness of breath  CARDIOVASCULAR: No chest pain, palpitations  GASTROINTESTINAL: No abdominal or epigastric pain. No nausea, vomiting  NEUROLOGICAL: No headaches, memory loss, loss of strength.  MISCELLANEOUS:      Vital Signs Last 24 Hrs  T(C): 36.9 (27 Sep 2019 09:32), Max: 37.3 (26 Sep 2019 18:54)  T(F): 98.5 (27 Sep 2019 09:32), Max: 99.1 (26 Sep 2019 18:54)  HR: 82 (27 Sep 2019 09:32) (73 - 86)  BP: 101/62 (27 Sep 2019 09:32) (98/52 - 123/65)  BP(mean): 72 (26 Sep 2019 18:00) (72 - 86)  RR: 16 (27 Sep 2019 09:32) (15 - 29)  SpO2: 96% (27 Sep 2019 09:32) (95% - 100%)    PHYSICAL EXAM:    General: well groomed, sitting comfortable in chair, nad obese  HEENT: PERRL. Symmetric.  PULM: Diminished breath sounds B/L,   CVS: Regular rate and rhythm. s1, s2  ABD: Morbidly obese. Soft, nondistended, nontender, BS+  EXT: B/L edema, nontender. Chronic venous stasis changes.  SKIN: Warm and well perfused.  NEURO: Alert and oriented x3, moves all extremities.  Psych: normal mood and affect       LABS:                        10.8   9.24  )-----------( 181      ( 27 Sep 2019 05:57 )             33.4     09-27    134<L>  |  98  |  32.0<H>  ----------------------------<  157<H>  4.1   |  22.0  |  1.02    Ca    8.8      27 Sep 2019 05:57  Phos  2.4     09-27  Mg     2.2     09-27        MEDICATIONS  (STANDING):  aspirin enteric coated 81 milliGRAM(s) Oral daily  cefepime   IVPB 2000 milliGRAM(s) IV Intermittent <User Schedule>  cholecalciferol 1000 Unit(s) Oral daily  cyanocobalamin 100 micrograms 2 Tablet(s) 2 Tablet(s) Oral daily  dextrose 5%. 1000 milliLiter(s) (50 mL/Hr) IV Continuous <Continuous>  dextrose 50% Injectable 12.5 Gram(s) IV Push once  dextrose 50% Injectable 25 Gram(s) IV Push once  dextrose 50% Injectable 25 Gram(s) IV Push once  heparin  Injectable 7500 Unit(s) SubCutaneous every 8 hours  influenza   Vaccine 0.5 milliLiter(s) IntraMuscular once  insulin glargine Injectable (LANTUS) 15 Unit(s) SubCutaneous every morning  insulin lispro (HumaLOG) corrective regimen sliding scale.   SubCutaneous four times a day with meals  insulin lispro Injectable (HumaLOG) 5 Unit(s) SubCutaneous three times a day before meals  levothyroxine 75 MICROGram(s) Oral daily  midodrine 5 milliGRAM(s) Oral every 8 hours  nystatin Cream 1 Application(s) Topical two times a day  tamoxifen 20 milliGRAM(s) Oral daily  tamsulosin 0.4 milliGRAM(s) Oral at bedtime  vancomycin  IVPB 1500 milliGRAM(s) IV Intermittent every 12 hours    MEDICATIONS  (PRN):  acetaminophen   Tablet .. 650 milliGRAM(s) Oral every 6 hours PRN Mild Pain (1 - 3)  dextrose 40% Gel 15 Gram(s) Oral once PRN Blood Glucose LESS THAN 70 milliGRAM(s)/deciliter  glucagon  Injectable 1 milliGRAM(s) IntraMuscular once PRN Glucose LESS THAN 70 milligrams/deciliter      RADIOLOGY & ADDITIONAL TESTS:

## 2019-09-27 NOTE — CONSULT NOTE ADULT - SUBJECTIVE AND OBJECTIVE BOX
[place] [facility]                                   [address] [place] [address]                                                      PHONE: [phone]                                                         FAX: [phone]  [email or web address]  ---------------------------------------------------------------------------------------------------------------------------------    Reason for Consult: hypotension    HPI:  SRAVANI FRAZIER is a 70 y/o M w/ a PMHx of prostate CA, colon CA s/p resection, L breast CA s/p resection (on Tamoxifen), recently diagnosed multiple myeloma (followed at [facility]), ischemic CM s/p BiV ICD, CAD, T2DM, HTN, MAIRA on home CPAP, morbid obesity, who presented to the ED c/o worsening fatigue. Found to be in septic shock 2/2 unknown source, with VIKTORIYA, anion gap metabolic acidosis, rhabdomyolysis. Shock has now resolved and he is transferred to telemetry floor. He has no cardiovascular complaints at this time. His cardiologist is Dr. White in [place]. He says he had ICD placed for congestive heart failure.     PAST MEDICAL & SURGICAL HISTORY:  Prostate cancer  DM (diabetes mellitus)  HTN (hypertension)  Breast cancer      No Known Allergies      MEDICATIONS  (STANDING):  aspirin enteric coated 81 milliGRAM(s) Oral daily  cefepime   IVPB 2000 milliGRAM(s) IV Intermittent <User Schedule>  cholecalciferol 1000 Unit(s) Oral daily  cyanocobalamin 100 micrograms 2 Tablet(s) 2 Tablet(s) Oral daily  dextrose 5%. 1000 milliLiter(s) (50 mL/Hr) IV Continuous <Continuous>  dextrose 50% Injectable 12.5 Gram(s) IV Push once  dextrose 50% Injectable 25 Gram(s) IV Push once  dextrose 50% Injectable 25 Gram(s) IV Push once  heparin  Injectable 7500 Unit(s) SubCutaneous every 8 hours  influenza   Vaccine 0.5 milliLiter(s) IntraMuscular once  insulin glargine Injectable (LANTUS) 15 Unit(s) SubCutaneous every morning  insulin lispro (HumaLOG) corrective regimen sliding scale.   SubCutaneous four times a day with meals  insulin lispro Injectable (HumaLOG) 5 Unit(s) SubCutaneous three times a day before meals  levothyroxine 75 MICROGram(s) Oral daily  midodrine 5 milliGRAM(s) Oral every 8 hours  nystatin Cream 1 Application(s) Topical two times a day  tamoxifen 20 milliGRAM(s) Oral daily  tamsulosin 0.4 milliGRAM(s) Oral at bedtime  vancomycin  IVPB 1500 milliGRAM(s) IV Intermittent every 12 hours    MEDICATIONS  (PRN):  acetaminophen   Tablet .. 650 milliGRAM(s) Oral every 6 hours PRN Mild Pain (1 - 3)  dextrose 40% Gel 15 Gram(s) Oral once PRN Blood Glucose LESS THAN 70 milliGRAM(s)/deciliter  glucagon  Injectable 1 milliGRAM(s) IntraMuscular once PRN Glucose LESS THAN 70 milligrams/deciliter      Social History:  Cigarettes:                    Alchohol:                 Illicit Drug Abuse:      ROS: Negative other than as mentioned in HPI.    Vital Signs Last 24 Hrs  T(C): 36.9 (27 Sep 2019 09:32), Max: 37.3 (26 Sep 2019 18:54)  T(F): 98.5 (27 Sep 2019 09:32), Max: 99.1 (26 Sep 2019 18:54)  HR: 82 (27 Sep 2019 09:32) (67 - 86)  BP: 101/62 (27 Sep 2019 09:32) (93/53 - 144/67)  BP(mean): 72 (26 Sep 2019 18:00) (69 - 96)  RR: 16 (27 Sep 2019 09:32) (13 - 29)  SpO2: 96% (27 Sep 2019 09:32) (95% - 100%)  ICU Vital Signs Last 24 Hrs  SRAVANI FRAZIER  I&O's Detail    26 Sep 2019 07:01  -  27 Sep 2019 07:00  --------------------------------------------------------  IN:    insulin regular Infusion: 12 mL    Oral Fluid: 480 mL    Solution: 100 mL    Solution: 500 mL  Total IN: 1092 mL    OUT:    Voided: 1300 mL  Total OUT: 1300 mL    Total NET: -208 mL        I&O's Summary    26 Sep 2019 07:01  -  27 Sep 2019 07:00  --------------------------------------------------------  IN: 1092 mL / OUT: 1300 mL / NET: -208 mL      Drug Dosing Weight  SRAVANI FRAZIER      PHYSICAL EXAM:  General: Appears well developed, well nourished alert and cooperative.  HEENT: Head; normocephalic, atraumatic.  Eyes: Pupils reactive, cornea wnl.  Neck: Supple, no nodes adenopathy, no NVD or carotid bruit or thyromegaly.  CARDIOVASCULAR: Normal S1 and S2, No murmur, rub, gallop or lift.   LUNGS: No rales, rhonchi or wheeze. Normal breath sounds bilaterally.  ABDOMEN: Soft, nontender without mass or organomegaly. bowel sounds normoactive.  EXTREMITIES: No clubbing, cyanosis or edema. Distal pulses wnl.   SKIN: warm and dry with normal turgor.  NEURO: Alert/oriented x 3/normal motor exam. No pathologic reflexes.    PSYCH: normal affect.        LABS:                        10.8   9.24  )-----------( 181      ( 27 Sep 2019 05:57 )             33.4     09-27    134<L>  |  98  |  32.0<H>  ----------------------------<  157<H>  4.1   |  22.0  |  1.02    Ca    8.8      27 Sep 2019 05:57  Phos  2.4     09-27  Mg     2.2     09-27      SRAVANI FRAZIER  CARDIAC MARKERS ( 27 Sep 2019 05:57 )  x     / x     / 1053 U/L / x     / 2.2 ng/mL  CARDIAC MARKERS ( 26 Sep 2019 03:28 )  x     / x     / 2626 U/L / x     / 4.5 ng/mL            RADIOLOGY & ADDITIONAL STUDIES:    INTERPRETATION OF TELEMETRY (personally reviewed): V paced    ECG: V paced 102 bpm    ECHO:  1. Left ventricular ejection fraction, by visual estimation, is 50 to   55%.   2. Low normal global left ventricular systolic function.   3. Severely reduced RV systolic function.   4. Thickening of the anterior and posterior mitral valve leaflets.   5. Sclerotic aortic valve with normal opening.   6. Endocardial visualization was enhanced with intravenous echo contrast.    Assessment and Plan:  In summary, SRAVANI FRAZIER is an 69y Male with past medical history significant for prostate CA, colon CA s/p resection, L breast CA s/p resection (on Tamoxifen), recently diagnosed multiple myeloma (followed at [facility]), ischemic CM s/p BiV ICD, CAD, T2DM, HTN, MAIRA on home CPAP, morbid obesity, who presented to the ED c/o worsening fatigue. Found to be in septic shock 2/2 unknown source, with VIKTORIYA, anion gap metabolic acidosis, rhabdomyolysis. Shock has now resolved and he is transferred to telemetry floor. He has no cardiovascular complaints at this time.     Chronic Systolic HF (improvement in EF), BiV ICD, Septic Shock (resolved) -- echo shows normal LV function with severely reduced RV function (unclear chronicity). There is no clinical evidence of pulmonary embolus at this time.   - can slowly re-introduce HF meds as BP tolerates -- introduce one at a time starting with Coreg  - supportive care for sepsis    No further inpatient cardiac workup indicated at this time. He will need close follow up with his outpatient cardiologist.

## 2019-09-27 NOTE — CONSULT NOTE ADULT - SUBJECTIVE AND OBJECTIVE BOX
[facility]  INFECTIOUS DISEASES AND INTERNAL MEDICINE at [place]  =======================================================  Raheel Monae MD  Diplomates American Board of Internal Medicine and Infectious Diseases  Tel: [phone]      Fax: [phone]  =======================================================      [id number]  SRAVANI FRAZIER is a 69y  Male     CC: Patient is a 69y old  Male who presents with a chief complaint of Shock (27 Sep 2019 13:00)    HPI:  69 morbidly obese male w/ PMHx DM, hypothyroid, CAD, ICM/ HFrEF s/p BiVICD, PVD, remote prostate CA, Colon CA s/p resection, L breast CA s/p mastectomy (on Tamoxifen) and recently diagnosed w/ MM ( f/[place] [facility] [place] presented to the ED with fatigue. He supposedly could not get out of his car b/c of fatigue. Denies any chest pain, abd pain, N/V/D/C/fever/chills/dysuria or cough. H/o recurrent falls, last one about a week back when he had a head wound. Also endorses chronic back pain. In ED today he was found to be in shock, BP   74/47mmHG w/ T max 102.7 and lactate 5.3. Received a bolus ~ 3.5L and was started on Levo gtt. On [facility] eval pt was awake, sitting up in bed and did not have any new complaints  Did not take any of his meds today  EKG Paced rhythm. CXR appears mildly congested (24 Sep 2019 21:28)      PAST MEDICAL & SURGICAL HISTORY:  Prostate cancer  DM (diabetes mellitus)  HTN (hypertension)  Breast cancer      Social Hx:    FAMILY HISTORY:      Allergies    No Known Allergies    Intolerances        MEDICATIONS  (STANDING):  aspirin enteric coated 81 milliGRAM(s) Oral daily  cefepime   IVPB 2000 milliGRAM(s) IV Intermittent <User Schedule>  cholecalciferol 1000 Unit(s) Oral daily  cyanocobalamin 100 micrograms 2 Tablet(s) 2 Tablet(s) Oral daily  dextrose 5%. 1000 milliLiter(s) (50 mL/Hr) IV Continuous <Continuous>  dextrose 50% Injectable 12.5 Gram(s) IV Push once  dextrose 50% Injectable 25 Gram(s) IV Push once  dextrose 50% Injectable 25 Gram(s) IV Push once  heparin  Injectable 7500 Unit(s) SubCutaneous every 8 hours  influenza   Vaccine 0.5 milliLiter(s) IntraMuscular once  insulin glargine Injectable (LANTUS) 15 Unit(s) SubCutaneous every morning  insulin lispro (HumaLOG) corrective regimen sliding scale.   SubCutaneous four times a day with meals  insulin lispro Injectable (HumaLOG) 5 Unit(s) SubCutaneous three times a day before meals  levothyroxine 75 MICROGram(s) Oral daily  magnesium citrate Oral Solution 1 Bottle Oral once  midodrine 5 milliGRAM(s) Oral every 8 hours  nystatin Cream 1 Application(s) Topical two times a day  polyethylene glycol 3350 17 Gram(s) Oral once  tamoxifen 20 milliGRAM(s) Oral daily  tamsulosin 0.4 milliGRAM(s) Oral at bedtime  vancomycin  IVPB 1500 milliGRAM(s) IV Intermittent every 12 hours    MEDICATIONS  (PRN):  acetaminophen   Tablet .. 650 milliGRAM(s) Oral every 6 hours PRN Mild Pain (1 - 3)  bisacodyl Suppository 10 milliGRAM(s) Rectal daily PRN Constipation  dextrose 40% Gel 15 Gram(s) Oral once PRN Blood Glucose LESS THAN 70 milliGRAM(s)/deciliter  glucagon  Injectable 1 milliGRAM(s) IntraMuscular once PRN Glucose LESS THAN 70 milligrams/deciliter      ANTIMICROBIALS:  cefepime   IVPB 2000 <User Schedule>  vancomycin  IVPB 1500 every 12 hours      OTHER MEDS: MEDICATIONS  (STANDING):  acetaminophen   Tablet .. 650 every 6 hours PRN  aspirin enteric coated 81 daily  bisacodyl Suppository 10 daily PRN  dextrose 40% Gel 15 once PRN  dextrose 50% Injectable 12.5 once  dextrose 50% Injectable 25 once  dextrose 50% Injectable 25 once  glucagon  Injectable 1 once PRN  heparin  Injectable 7500 every 8 hours  influenza   Vaccine 0.5 once  insulin glargine Injectable (LANTUS) 15 every morning  insulin lispro (HumaLOG) corrective regimen sliding scale.  four times a day with meals  insulin lispro Injectable (HumaLOG) 5 three times a day before meals  levothyroxine 75 daily  magnesium citrate Oral Solution 1 once  midodrine 5 every 8 hours  polyethylene glycol 3350 17 once  tamoxifen 20 daily  tamsulosin 0.4 at bedtime             REVIEW OF SYSTEMS:  CONSTITUTIONAL:  No Fever or chills  HEENT:  No diplopia or blurred vision.  No earache, sore throat or runny nose.  CARDIOVASCULAR:  No pressure, squeezing, strangling, tightness, heaviness or aching about the chest, neck, axilla or epigastrium.  RESPIRATORY:  No cough, shortness of breath  GASTROINTESTINAL:  No nausea, vomiting or diarrhea.  GENITOURINARY:  No dysuria, frequency or urgency. No Blood in urine  MUSCULOSKELETAL:  no joint aches, no muscle pain  SKIN:  No change in skin, hair or nails.  NEUROLOGIC:  No Headaches, seizures or weakness.  PSYCHIATRIC:  No disorder of thought or mood.  ENDOCRINE:  No heat or cold intolerance  HEMATOLOGICAL:  No easy bruising or bleeding.           I&O's Detail    26 Sep 2019 07:01  -  27 Sep 2019 07:00  --------------------------------------------------------  IN:    insulin regular Infusion: 12 mL    Oral Fluid: 480 mL    Solution: 100 mL    Solution: 500 mL  Total IN: 1092 mL    OUT:    Voided: 1300 mL  Total OUT: 1300 mL    Total NET: -208 mL      27 Sep 2019 07:01  -  27 Sep 2019 16:39  --------------------------------------------------------  IN:    Oral Fluid: 680 mL  Total IN: 680 mL    OUT:    Intermittent Catheterization - Urethral: 1400 mL  Total OUT: 1400 mL    Total NET: -720 mL            Physical Exam:  Vital Signs Last 24 Hrs  T(C): 37.3 (27 Sep 2019 15:54), Max: 37.3 (26 Sep 2019 18:54)  T(F): 99.2 (27 Sep 2019 15:54), Max: 99.2 (27 Sep 2019 15:54)  HR: 94 (27 Sep 2019 15:54) (77 - 94)  BP: 96/54 (27 Sep 2019 15:54) (96/54 - 109/51)  BP(mean): 72 (26 Sep 2019 18:00) (72 - 74)  RR: 16 (27 Sep 2019 15:54) (15 - 25)  SpO2: 96% (27 Sep 2019 15:54) (95% - 100%)  Height (cm): 170.18 (09-26 @ 22:45)  Weight (kg): 179.8 (09-26 @ 22:45)  BMI (kg/m2): 62.1 (09-26 @ 22:45)  BSA (m2): 2.7 (09-26 @ 22:45)  GEN: NAD, pleasant  HEENT: normocephalic and atraumatic. EOMI. PERRL.  Anicteric  NECK: Supple.   LUNGS: Clear to auscultation.  HEART: Regular rate and rhythm without murmur.  ABDOMEN: Soft, nontender, and nondistended.  Positive bowel sounds.    : No CVA tenderness  EXTREMITIES: Without any edema.  MSK: No joint swelling  NEUROLOGIC: Cranial nerves II through XII are grossly intact. No Focal Deficits  PSYCHIATRIC: Appropriate affect .  SKIN: No Rash        Labs:  09-27    134<L>  |  98  |  32.0<H>  ----------------------------<  157<H>  4.1   |  22.0  |  1.02    Ca    8.8      27 Sep 2019 05:57  Phos  2.4     09-27  Mg     2.2     09-27                            10.8   9.24  )-----------( 181      ( 27 Sep 2019 05:57 )             33.4             CARDIAC MARKERS ( 27 Sep 2019 05:57 )  x     / x     / 1053 U/L / x     / 2.2 ng/mL  CARDIAC MARKERS ( 26 Sep 2019 03:28 )  x     / x     / 2626 U/L / x     / 4.5 ng/mL      CAPILLARY BLOOD GLUCOSE      POCT Blood Glucose.: 206 mg/dL (27 Sep 2019 12:11)  POCT Blood Glucose.: 162 mg/dL (27 Sep 2019 08:02)  POCT Blood Glucose.: 221 mg/dL (26 Sep 2019 21:52)  POCT Blood Glucose.: 208 mg/dL (26 Sep 2019 16:47)        RECENT CULTURES:  09-25 @ 20:28          NotDetec  09-24 @ 19:18 .Urine     No growth        09-24 @ 17:26 .Blood     No growth at 48 hours              Radiology: [facility]  INFECTIOUS DISEASES AND INTERNAL MEDICINE at [place]  =======================================================  Raheel Monae MD  Diplomates American Board of Internal Medicine and Infectious Diseases  Tel: [phone]      Fax: [phone]  =======================================================      [id number]  SRAVANI FRAZIER is a 69y  Male     CC: Patient is a 69y old  Male who presents with a chief complaint of Shock (27 Sep 2019 13:00)    HPI:  69 morbidly obese male w/ PMHx DM, hypothyroid, CAD, ICM/ HFrEF s/p BiVICD, PVD, remote prostate CA, Colon CA s/p resection, L breast CA s/p mastectomy (on Tamoxifen) and recently diagnosed w/ MM ( f/u at [facility] [place]) presented to the ED with fatigue. He supposedly could not get out of his car b/c of fatigue. Denies any chest pain, abd pain, N/V/D/C/fever/chills/dysuria or cough. H/o recurrent falls, last one about a week back when he had a head wound. Also endorses chronic back pain. In ED today he was found to be in shock, BP   74/47mmHG w/ T max 102.7 and lactate 5.3. Received a bolus ~ 3.5L and was started on Levo gtt. On [facility] eval pt was awake, sitting up in bed and did not have any new complaints  Did not take any of his meds today  EKG Paced rhythm. CXR appears mildly congested (24 Sep 2019 21:28)      PAST MEDICAL & SURGICAL HISTORY:  Prostate cancer  DM (diabetes mellitus)  HTN (hypertension)  Breast cancer      Social Hx:  non smoker  FAMILY HISTORY:  not significant      Allergies    No Known Allergies    Intolerances        MEDICATIONS  (STANDING):  aspirin enteric coated 81 milliGRAM(s) Oral daily  cefepime   IVPB 2000 milliGRAM(s) IV Intermittent <User Schedule>  cholecalciferol 1000 Unit(s) Oral daily  cyanocobalamin 100 micrograms 2 Tablet(s) 2 Tablet(s) Oral daily  dextrose 5%. 1000 milliLiter(s) (50 mL/Hr) IV Continuous <Continuous>  dextrose 50% Injectable 12.5 Gram(s) IV Push once  dextrose 50% Injectable 25 Gram(s) IV Push once  dextrose 50% Injectable 25 Gram(s) IV Push once  heparin  Injectable 7500 Unit(s) SubCutaneous every 8 hours  influenza   Vaccine 0.5 milliLiter(s) IntraMuscular once  insulin glargine Injectable (LANTUS) 15 Unit(s) SubCutaneous every morning  insulin lispro (HumaLOG) corrective regimen sliding scale.   SubCutaneous four times a day with meals  insulin lispro Injectable (HumaLOG) 5 Unit(s) SubCutaneous three times a day before meals  levothyroxine 75 MICROGram(s) Oral daily  magnesium citrate Oral Solution 1 Bottle Oral once  midodrine 5 milliGRAM(s) Oral every 8 hours  nystatin Cream 1 Application(s) Topical two times a day  polyethylene glycol 3350 17 Gram(s) Oral once  tamoxifen 20 milliGRAM(s) Oral daily  tamsulosin 0.4 milliGRAM(s) Oral at bedtime  vancomycin  IVPB 1500 milliGRAM(s) IV Intermittent every 12 hours    MEDICATIONS  (PRN):  acetaminophen   Tablet .. 650 milliGRAM(s) Oral every 6 hours PRN Mild Pain (1 - 3)  bisacodyl Suppository 10 milliGRAM(s) Rectal daily PRN Constipation  dextrose 40% Gel 15 Gram(s) Oral once PRN Blood Glucose LESS THAN 70 milliGRAM(s)/deciliter  glucagon  Injectable 1 milliGRAM(s) IntraMuscular once PRN Glucose LESS THAN 70 milligrams/deciliter      ANTIMICROBIALS:  cefepime   IVPB 2000 <User Schedule>  vancomycin  IVPB 1500 every 12 hours      OTHER MEDS: MEDICATIONS  (STANDING):  acetaminophen   Tablet .. 650 every 6 hours PRN  aspirin enteric coated 81 daily  bisacodyl Suppository 10 daily PRN  dextrose 40% Gel 15 once PRN  dextrose 50% Injectable 12.5 once  dextrose 50% Injectable 25 once  dextrose 50% Injectable 25 once  glucagon  Injectable 1 once PRN  heparin  Injectable 7500 every 8 hours  influenza   Vaccine 0.5 once  insulin glargine Injectable (LANTUS) 15 every morning  insulin lispro (HumaLOG) corrective regimen sliding scale.  four times a day with meals  insulin lispro Injectable (HumaLOG) 5 three times a day before meals  levothyroxine 75 daily  magnesium citrate Oral Solution 1 once  midodrine 5 every 8 hours  polyethylene glycol 3350 17 once  tamoxifen 20 daily  tamsulosin 0.4 at bedtime             REVIEW OF SYSTEMS:  CONSTITUTIONAL:  No Fever or chills  HEENT:  No diplopia or blurred vision.  No earache, sore throat or runny nose.  CARDIOVASCULAR:  No pressure, squeezing, strangling, tightness, heaviness or aching about the chest, neck, axilla or epigastrium.  RESPIRATORY:  No cough, shortness of breath  GASTROINTESTINAL:  No nausea, vomiting or diarrhea.  GENITOURINARY:  No dysuria, frequency or urgency. No Blood in urine  MUSCULOSKELETAL:  no joint aches, no muscle pain  SKIN:  No change in skin, hair or nails.  NEUROLOGIC:  No Headaches, seizures or weakness.  PSYCHIATRIC:  No disorder of thought or mood.  ENDOCRINE:  No heat or cold intolerance  HEMATOLOGICAL:  No easy bruising or bleeding.           I&O's Detail    26 Sep 2019 07:01  -  27 Sep 2019 07:00  --------------------------------------------------------  IN:    insulin regular Infusion: 12 mL    Oral Fluid: 480 mL    Solution: 100 mL    Solution: 500 mL  Total IN: 1092 mL    OUT:    Voided: 1300 mL  Total OUT: 1300 mL    Total NET: -208 mL      27 Sep 2019 07:01  -  27 Sep 2019 16:39  --------------------------------------------------------  IN:    Oral Fluid: 680 mL  Total IN: 680 mL    OUT:    Intermittent Catheterization - Urethral: 1400 mL  Total OUT: 1400 mL    Total NET: -720 mL            Physical Exam:  Vital Signs Last 24 Hrs  T(C): 37.3 (27 Sep 2019 15:54), Max: 37.3 (26 Sep 2019 18:54)  T(F): 99.2 (27 Sep 2019 15:54), Max: 99.2 (27 Sep 2019 15:54)  HR: 94 (27 Sep 2019 15:54) (77 - 94)  BP: 96/54 (27 Sep 2019 15:54) (96/54 - 109/51)  BP(mean): 72 (26 Sep 2019 18:00) (72 - 74)  RR: 16 (27 Sep 2019 15:54) (15 - 25)  SpO2: 96% (27 Sep 2019 15:54) (95% - 100%)  Height (cm): 170.18 (09-26 @ 22:45)  Weight (kg): 179.8 (09-26 @ 22:45)  BMI (kg/m2): 62.1 (09-26 @ 22:45)  BSA (m2): 2.7 (09-26 @ 22:45)  GEN: NAD, pleasant  HEENT: normocephalic and atraumatic. EOMI. PERRL.  Anicteric  NECK: Supple.   LUNGS: Clear to auscultation.  HEART: Regular rate and rhythm without murmur. + ICD  ABDOMEN: Soft, nontender, and nondistended.  Positive bowel sounds.    : No CVA tenderness  EXTREMITIES: Without any edema.  MSK: No joint swelling lumbar TTP  NEUROLOGIC: Cranial nerves II through XII are grossly intact. No Focal Deficits  PSYCHIATRIC: Appropriate affect .  SKIN: No Rash b/l chronic venous stasis changes and xerosis, LLE erythema, swelling greater than right, tender at posterior calf on left        Labs:  09-27    134<L>  |  98  |  32.0<H>  ----------------------------<  157<H>  4.1   |  22.0  |  1.02    Ca    8.8      27 Sep 2019 05:57  Phos  2.4     09-27  Mg     2.2     09-27                            10.8   9.24  )-----------( 181      ( 27 Sep 2019 05:57 )             33.4             CARDIAC MARKERS ( 27 Sep 2019 05:57 )  x     / x     / 1053 U/L / x     / 2.2 ng/mL  CARDIAC MARKERS ( 26 Sep 2019 03:28 )  x     / x     / 2626 U/L / x     / 4.5 ng/mL      CAPILLARY BLOOD GLUCOSE      POCT Blood Glucose.: 206 mg/dL (27 Sep 2019 12:11)  POCT Blood Glucose.: 162 mg/dL (27 Sep 2019 08:02)  POCT Blood Glucose.: 221 mg/dL (26 Sep 2019 21:52)  POCT Blood Glucose.: 208 mg/dL (26 Sep 2019 16:47)        RECENT CULTURES:  09-25 @ 20:28          NotDetec  09-24 @ 19:18 .Urine     No growth        09-24 @ 17:26 .Blood     No growth at 48 hours              Radiology:  < from: US Duplex Venous Lower Ext Complete, Bilateral (09.25.19 @ 13:34) >  FINDINGS:    There is normal compressibility of the bilateral common femoral, femoral   and popliteal veins.     Doppler examination shows normal spontaneous and phasic flow.    Calf veins not well seen; tibioperoneal trunk patent and compressible   bilaterally. Calf veins not seen more distally.    IMPRESSION:     Limited evaluation of the calf veins, otherwise no evidence of deep   venous thrombosis in either lower extremity.    < end of copied text >    < from: CT Abdomen and Pelvis No Cont (09.25.19 @ 00:14) >  FINDINGS:   Limitations: Limited secondary to streak artifact from the patient's   arms, greatest in the upper abdomen, limiting evaluation of the liver,   biliary tree, and pancreas.    Liver: Enlarged. No mass.  Gallbladder and bile ducts: Cholelithiasis. No biliary ductal dilatation.  Pancreas: Limited. Mild fatty infiltration.  Spleen: Normal size. No mass.  Adrenals: Unremarkable.  Kidneys and ureters: Normal size. No hydronephrosis. 1-2 mm   nonobstructing calculus in the mid right kidney.    Stomach and bowel: No bowel obstruction. Surgical sutures are   present along the stomach. Appendix is normal.  Intraperitoneal space: No ascites or free air.  Vasculature: Abdominal aorta normal in caliber with mild calcified plaque.  Retroperitoneum/lymph nodes: No enlarged lymph nodes. Mild infiltration   of the fat at the superior aspect of the left hemipelvis adjacent to the   iliac vasculature (series 3 image 270).    Bladder: Unremarkable.  Reproductive: The prostate is diminutive or absent.  Bones: Degenerative changes in the spine. No aggressive osseous lesion.  Abdominal wall: Diastases of the anterior abdominal wall at the   umbilicus/small fat-containing hernia.      IMPRESSION:    Slightly limited evaluation of the upper abdomen secondary to streak   artifact from patient's arms.    No hydronephrosis.    Mild infiltration of the retroperitoneal fat at the superior aspect of   the left hemipelvis of uncertain etiology, possibly reflecting   inflammation.    < end of copied text >    < from: CT Head No Cont (09.25.19 @ 00:14) >      IMPRESSION:   No acute intracranial abnormality. Changes of an acute infarct may not be   visible on CT for up to 24 to 48 hours. MRI is more sensitive for the   evaluation of an acute infarction. Preliminary report provided by [facility]   TREVER VALENZUELA M.D.;[facility] RADIOLOGIST.        < end of copied text >

## 2019-09-28 LAB
ALBUMIN SERPL ELPH-MCNC: 3.1 G/DL — LOW (ref 3.3–5.2)
ALP SERPL-CCNC: 77 U/L — SIGNIFICANT CHANGE UP (ref 40–120)
ALT FLD-CCNC: 28 U/L — SIGNIFICANT CHANGE UP
ANION GAP SERPL CALC-SCNC: 13 MMOL/L — SIGNIFICANT CHANGE UP (ref 5–17)
ANISOCYTOSIS BLD QL: SLIGHT — SIGNIFICANT CHANGE UP
AST SERPL-CCNC: 42 U/L — HIGH
BASOPHILS # BLD AUTO: 0.06 K/UL — SIGNIFICANT CHANGE UP (ref 0–0.2)
BASOPHILS NFR BLD AUTO: 0.9 % — SIGNIFICANT CHANGE UP (ref 0–2)
BILIRUB SERPL-MCNC: 0.4 MG/DL — SIGNIFICANT CHANGE UP (ref 0.4–2)
BUN SERPL-MCNC: 28 MG/DL — HIGH (ref 8–20)
CALCIUM SERPL-MCNC: 9 MG/DL — SIGNIFICANT CHANGE UP (ref 8.6–10.2)
CHLORIDE SERPL-SCNC: 101 MMOL/L — SIGNIFICANT CHANGE UP (ref 98–107)
CO2 SERPL-SCNC: 23 MMOL/L — SIGNIFICANT CHANGE UP (ref 22–29)
CREAT SERPL-MCNC: 0.92 MG/DL — SIGNIFICANT CHANGE UP (ref 0.5–1.3)
CRP SERPL-MCNC: 7.81 MG/DL — HIGH (ref 0–0.4)
EOSINOPHIL # BLD AUTO: 0.16 K/UL — SIGNIFICANT CHANGE UP (ref 0–0.5)
EOSINOPHIL NFR BLD AUTO: 2.6 % — SIGNIFICANT CHANGE UP (ref 0–6)
ERYTHROCYTE [SEDIMENTATION RATE] IN BLOOD: 55 MM/HR — HIGH (ref 0–20)
GIANT PLATELETS BLD QL SMEAR: PRESENT — SIGNIFICANT CHANGE UP
GLUCOSE BLDC GLUCOMTR-MCNC: 153 MG/DL — HIGH (ref 70–99)
GLUCOSE BLDC GLUCOMTR-MCNC: 180 MG/DL — HIGH (ref 70–99)
GLUCOSE BLDC GLUCOMTR-MCNC: 182 MG/DL — HIGH (ref 70–99)
GLUCOSE BLDC GLUCOMTR-MCNC: 186 MG/DL — HIGH (ref 70–99)
GLUCOSE BLDC GLUCOMTR-MCNC: 258 MG/DL — HIGH (ref 70–99)
GLUCOSE SERPL-MCNC: 148 MG/DL — HIGH (ref 70–115)
HCT VFR BLD CALC: 34.6 % — LOW (ref 39–50)
HGB BLD-MCNC: 11.2 G/DL — LOW (ref 13–17)
LYMPHOCYTES # BLD AUTO: 0.64 K/UL — LOW (ref 1–3.3)
LYMPHOCYTES # BLD AUTO: 10.4 % — LOW (ref 13–44)
MAGNESIUM SERPL-MCNC: 2.2 MG/DL — SIGNIFICANT CHANGE UP (ref 1.6–2.6)
MANUAL SMEAR VERIFICATION: SIGNIFICANT CHANGE UP
MCHC RBC-ENTMCNC: 32 PG — SIGNIFICANT CHANGE UP (ref 27–34)
MCHC RBC-ENTMCNC: 32.4 GM/DL — SIGNIFICANT CHANGE UP (ref 32–36)
MCV RBC AUTO: 98.9 FL — SIGNIFICANT CHANGE UP (ref 80–100)
MONOCYTES # BLD AUTO: 0.69 K/UL — SIGNIFICANT CHANGE UP (ref 0–0.9)
MONOCYTES NFR BLD AUTO: 11.3 % — SIGNIFICANT CHANGE UP (ref 2–14)
MYELOCYTES NFR BLD: 0.9 % — HIGH (ref 0–0)
NEUTROPHILS # BLD AUTO: 4.54 K/UL — SIGNIFICANT CHANGE UP (ref 1.8–7.4)
NEUTROPHILS NFR BLD AUTO: 64.3 % — SIGNIFICANT CHANGE UP (ref 43–77)
NEUTS BAND # BLD: 9.6 % — HIGH (ref 0–8)
OVALOCYTES BLD QL SMEAR: SLIGHT — SIGNIFICANT CHANGE UP
PLAT MORPH BLD: NORMAL — SIGNIFICANT CHANGE UP
PLATELET # BLD AUTO: 186 K/UL — SIGNIFICANT CHANGE UP (ref 150–400)
POIKILOCYTOSIS BLD QL AUTO: SLIGHT — SIGNIFICANT CHANGE UP
POLYCHROMASIA BLD QL SMEAR: SLIGHT — SIGNIFICANT CHANGE UP
POTASSIUM SERPL-MCNC: 4.4 MMOL/L — SIGNIFICANT CHANGE UP (ref 3.5–5.3)
POTASSIUM SERPL-SCNC: 4.4 MMOL/L — SIGNIFICANT CHANGE UP (ref 3.5–5.3)
PROT SERPL-MCNC: 7.1 G/DL — SIGNIFICANT CHANGE UP (ref 6.6–8.7)
RBC # BLD: 3.5 M/UL — LOW (ref 4.2–5.8)
RBC # FLD: 15 % — HIGH (ref 10.3–14.5)
RBC BLD AUTO: SIGNIFICANT CHANGE UP
SCHISTOCYTES BLD QL AUTO: SLIGHT — SIGNIFICANT CHANGE UP
SODIUM SERPL-SCNC: 137 MMOL/L — SIGNIFICANT CHANGE UP (ref 135–145)
TSH SERPL-MCNC: 7.86 UIU/ML — HIGH (ref 0.27–4.2)
VANCOMYCIN TROUGH SERPL-MCNC: 18.5 UG/ML — SIGNIFICANT CHANGE UP (ref 10–20)
WBC # BLD: 6.14 K/UL — SIGNIFICANT CHANGE UP (ref 3.8–10.5)
WBC # FLD AUTO: 6.14 K/UL — SIGNIFICANT CHANGE UP (ref 3.8–10.5)

## 2019-09-28 PROCEDURE — 72132 CT LUMBAR SPINE W/DYE: CPT | Mod: 26

## 2019-09-28 PROCEDURE — 73701 CT LOWER EXTREMITY W/DYE: CPT | Mod: 26,LT

## 2019-09-28 PROCEDURE — 99233 SBSQ HOSP IP/OBS HIGH 50: CPT

## 2019-09-28 RX ADMIN — Medication 650 MILLIGRAM(S): at 00:15

## 2019-09-28 RX ADMIN — Medication 650 MILLIGRAM(S): at 23:43

## 2019-09-28 RX ADMIN — Medication 2: at 12:37

## 2019-09-28 RX ADMIN — HEPARIN SODIUM 7500 UNIT(S): 5000 INJECTION INTRAVENOUS; SUBCUTANEOUS at 21:54

## 2019-09-28 RX ADMIN — Medication 300 MILLIGRAM(S): at 20:12

## 2019-09-28 RX ADMIN — CEFEPIME 100 MILLIGRAM(S): 1 INJECTION, POWDER, FOR SOLUTION INTRAMUSCULAR; INTRAVENOUS at 22:00

## 2019-09-28 RX ADMIN — MIDODRINE HYDROCHLORIDE 5 MILLIGRAM(S): 2.5 TABLET ORAL at 13:06

## 2019-09-28 RX ADMIN — HEPARIN SODIUM 7500 UNIT(S): 5000 INJECTION INTRAVENOUS; SUBCUTANEOUS at 07:51

## 2019-09-28 RX ADMIN — Medication 81 MILLIGRAM(S): at 12:36

## 2019-09-28 RX ADMIN — MIDODRINE HYDROCHLORIDE 5 MILLIGRAM(S): 2.5 TABLET ORAL at 07:51

## 2019-09-28 RX ADMIN — Medication 5 UNIT(S): at 17:34

## 2019-09-28 RX ADMIN — MIDODRINE HYDROCHLORIDE 5 MILLIGRAM(S): 2.5 TABLET ORAL at 21:54

## 2019-09-28 RX ADMIN — Medication 75 MICROGRAM(S): at 07:51

## 2019-09-28 RX ADMIN — TAMSULOSIN HYDROCHLORIDE 0.4 MILLIGRAM(S): 0.4 CAPSULE ORAL at 21:54

## 2019-09-28 RX ADMIN — Medication 300 MILLIGRAM(S): at 09:04

## 2019-09-28 RX ADMIN — Medication 2: at 17:35

## 2019-09-28 RX ADMIN — Medication 6: at 21:55

## 2019-09-28 RX ADMIN — NYSTATIN CREAM 1 APPLICATION(S): 100000 CREAM TOPICAL at 21:55

## 2019-09-28 RX ADMIN — Medication 5 UNIT(S): at 12:37

## 2019-09-28 RX ADMIN — TAMOXIFEN CITRATE 20 MILLIGRAM(S): 20 TABLET, FILM COATED ORAL at 12:36

## 2019-09-28 RX ADMIN — CEFEPIME 100 MILLIGRAM(S): 1 INJECTION, POWDER, FOR SOLUTION INTRAMUSCULAR; INTRAVENOUS at 13:05

## 2019-09-28 RX ADMIN — Medication 1000 UNIT(S): at 12:35

## 2019-09-28 RX ADMIN — NYSTATIN CREAM 1 APPLICATION(S): 100000 CREAM TOPICAL at 07:51

## 2019-09-28 RX ADMIN — HEPARIN SODIUM 7500 UNIT(S): 5000 INJECTION INTRAVENOUS; SUBCUTANEOUS at 13:11

## 2019-09-28 NOTE — PROGRESS NOTE ADULT - ASSESSMENT
Septic shock   unclear source  Continue Midodrine 5mg tid   Continue antibiotic  all cultures negative to date  --> ID consulted appreciated  --> likely source cellulitis?  --> follow up ct spine     VIKTORIYA and Rhabdomyolysis   improved  Monitor renal function     DM-2   Lantus 15 mg every morning, Humalog 5 mg tid  Insulin sliding scale  -> a1c 6.8     HTN   Hold all anti-hypertensive for now due to hypotension  Monitor BP   --> c.w midodrine  --> cardio cleared patient     Hypothyroidism   Continue Synthroid 75 mcg daily  --> tsh elevated  --> send thryoid panel for am     BPH   Tamsulosin 0.4mg     MAIRA   On CPAP at home. Patient was placed on BiPAP overnight and tolerated well  BiPAP PRN     Breast ca-left   S/p mastectomy   Continue Tamoxifen     Supportive  DVT prophylaxis 7500 units SQ q8 hrs  Diet: consistent carbohydrate     dispo - eventual dc home

## 2019-09-28 NOTE — PROGRESS NOTE ADULT - SUBJECTIVE AND OBJECTIVE BOX
SRAVANI FRAZIER    [id number]    69y      Male    INTERVAL HPI/OVERNIGHT EVENTS:    off service note:  70 y/o M w/ a PMHx of prostate CA, colon CA s/p resection, L breast CA s/p resection (on Tamoxifen), recently diagnosed [place] myel[place] ([place] [facility] [place] ischemic CM s/p BiV ICD, CAD, T2DM, HTN, MAIRA on home CPAP, morbid obesity, who presented to the ED c/o worsening fatigue. Found to be in septic shock 2/2 unknown source, with VIKTORIYA, anion gap metabolic acidosis, rhabdomyolysis. Pt remained hypotensive despite IVF resuscitation and was admitted to the MICU for vasopressor support. Pt developed worsening hyperglycemia requiring insulin gtt.  Pt is now off levophed and is idodrine 5 mg q8 hrs. LE doppler negative. Patient downgraded to medicine and continued on iv abx. Cardio and ID consulted by medicine to figure out the cause of shock.     patient seen at bedside and denies any complaints, patient eager to go home  patient had a large bm yesterday     REVIEW OF SYSTEMS:    CONSTITUTIONAL: No fever, weight loss, or fatigue  RESPIRATORY: No cough, wheezing, hemoptysis; No shortness of breath  CARDIOVASCULAR: No chest pain, palpitations  GASTROINTESTINAL: No abdominal or epigastric pain. No nausea, vomiting  NEUROLOGICAL: No headaches, memory loss, loss of strength.  MISCELLANEOUS:      Vital Signs Last 24 Hrs  T(C): 36.7 (28 Sep 2019 13:01), Max: 37.4 (27 Sep 2019 22:45)  T(F): 98 (28 Sep 2019 13:01), Max: 99.3 (27 Sep 2019 22:45)  HR: 75 (28 Sep 2019 13:01) (75 - 94)  BP: 99/59 (28 Sep 2019 13:01) (95/60 - 103/53)  BP(mean): --  RR: 14 (28 Sep 2019 13:01) (14 - 18)  SpO2: 97% (28 Sep 2019 13:01) (92% - 97%)    PHYSICAL EXAM:    General: well groomed, sitting comfortable in chair, nad obese  HEENT: PERRL. Symmetric.  PULM: Diminished breath sounds B/L,   CVS: Regular rate and rhythm. s1, s2  ABD: Morbidly obese. Soft, nondistended, nontender, BS+  EXT: B/L edema, nontender. Chronic venous stasis changes.  SKIN: Warm and well perfused.  NEURO: Alert and oriented x3, moves all extremities.  Psych: normal mood and affect       LABS:                        11.2   6.14  )-----------( 186      ( 28 Sep 2019 06:39 )             34.6     09-28    137  |  101  |  28.0<H>  ----------------------------<  148<H>  4.4   |  23.0  |  0.92    Ca    9.0      28 Sep 2019 06:39  Phos  2.4     09-27  Mg     2.2     09-28    TPro  7.1  /  Alb  3.1<L>  /  TBili  0.4  /  DBili  x   /  AST  42<H>  /  ALT  28  /  AlkPhos  77  09-28      MEDICATIONS  (STANDING):  aspirin enteric coated 81 milliGRAM(s) Oral daily  cefepime   IVPB 2000 milliGRAM(s) IV Intermittent <User Schedule>  cholecalciferol 1000 Unit(s) Oral daily  cyanocobalamin 100 micrograms 2 Tablet(s) 2 Tablet(s) Oral daily  dextrose 5%. 1000 milliLiter(s) (50 mL/Hr) IV Continuous <Continuous>  dextrose 50% Injectable 12.5 Gram(s) IV Push once  dextrose 50% Injectable 25 Gram(s) IV Push once  dextrose 50% Injectable 25 Gram(s) IV Push once  heparin  Injectable 7500 Unit(s) SubCutaneous every 8 hours  influenza   Vaccine 0.5 milliLiter(s) IntraMuscular once  insulin glargine Injectable (LANTUS) 15 Unit(s) SubCutaneous every morning  insulin lispro (HumaLOG) corrective regimen sliding scale.   SubCutaneous four times a day with meals  insulin lispro Injectable (HumaLOG) 5 Unit(s) SubCutaneous three times a day before meals  levothyroxine 75 MICROGram(s) Oral daily  midodrine 5 milliGRAM(s) Oral every 8 hours  nystatin Cream 1 Application(s) Topical two times a day  tamoxifen 20 milliGRAM(s) Oral daily  tamsulosin 0.4 milliGRAM(s) Oral at bedtime  vancomycin  IVPB 1500 milliGRAM(s) IV Intermittent every 12 hours    MEDICATIONS  (PRN):  acetaminophen   Tablet .. 650 milliGRAM(s) Oral every 6 hours PRN Mild Pain (1 - 3)  bisacodyl Suppository 10 milliGRAM(s) Rectal daily PRN Constipation  dextrose 40% Gel 15 Gram(s) Oral once PRN Blood Glucose LESS THAN 70 milliGRAM(s)/deciliter  glucagon  Injectable 1 milliGRAM(s) IntraMuscular once PRN Glucose LESS THAN 70 milligrams/deciliter      RADIOLOGY & ADDITIONAL TESTS:    ct lower extremity  showing cellulitis    ct spine performed follow up results

## 2019-09-29 LAB
CULTURE RESULTS: SIGNIFICANT CHANGE UP
CULTURE RESULTS: SIGNIFICANT CHANGE UP
GLUCOSE BLDC GLUCOMTR-MCNC: 187 MG/DL — HIGH (ref 70–99)
GLUCOSE BLDC GLUCOMTR-MCNC: 222 MG/DL — HIGH (ref 70–99)
GLUCOSE BLDC GLUCOMTR-MCNC: 235 MG/DL — HIGH (ref 70–99)
GLUCOSE BLDC GLUCOMTR-MCNC: 264 MG/DL — HIGH (ref 70–99)
SPECIMEN SOURCE: SIGNIFICANT CHANGE UP
SPECIMEN SOURCE: SIGNIFICANT CHANGE UP
T3 SERPL-MCNC: 91 NG/DL — SIGNIFICANT CHANGE UP (ref 80–200)
T4 AB SER-ACNC: 7 UG/DL — SIGNIFICANT CHANGE UP (ref 4.5–12)
VANCOMYCIN TROUGH SERPL-MCNC: 19.4 UG/ML — SIGNIFICANT CHANGE UP (ref 10–20)

## 2019-09-29 PROCEDURE — 99232 SBSQ HOSP IP/OBS MODERATE 35: CPT

## 2019-09-29 PROCEDURE — 99233 SBSQ HOSP IP/OBS HIGH 50: CPT

## 2019-09-29 RX ORDER — CEFAZOLIN SODIUM 1 G
2000 VIAL (EA) INJECTION EVERY 8 HOURS
Refills: 0 | Status: DISCONTINUED | OUTPATIENT
Start: 2019-09-29 | End: 2019-09-30

## 2019-09-29 RX ADMIN — Medication 4: at 12:49

## 2019-09-29 RX ADMIN — MIDODRINE HYDROCHLORIDE 5 MILLIGRAM(S): 2.5 TABLET ORAL at 06:31

## 2019-09-29 RX ADMIN — Medication 650 MILLIGRAM(S): at 00:13

## 2019-09-29 RX ADMIN — Medication 100 MILLIGRAM(S): at 21:56

## 2019-09-29 RX ADMIN — Medication 1000 UNIT(S): at 09:29

## 2019-09-29 RX ADMIN — TAMSULOSIN HYDROCHLORIDE 0.4 MILLIGRAM(S): 0.4 CAPSULE ORAL at 21:56

## 2019-09-29 RX ADMIN — Medication 5 UNIT(S): at 08:31

## 2019-09-29 RX ADMIN — Medication 4: at 08:31

## 2019-09-29 RX ADMIN — Medication 81 MILLIGRAM(S): at 09:29

## 2019-09-29 RX ADMIN — Medication 5 UNIT(S): at 17:30

## 2019-09-29 RX ADMIN — Medication 75 MICROGRAM(S): at 06:31

## 2019-09-29 RX ADMIN — Medication 2: at 21:56

## 2019-09-29 RX ADMIN — NYSTATIN CREAM 1 APPLICATION(S): 100000 CREAM TOPICAL at 21:56

## 2019-09-29 RX ADMIN — Medication 100 MILLIGRAM(S): at 15:40

## 2019-09-29 RX ADMIN — Medication 5 UNIT(S): at 12:49

## 2019-09-29 RX ADMIN — Medication 650 MILLIGRAM(S): at 23:38

## 2019-09-29 RX ADMIN — Medication 6: at 17:30

## 2019-09-29 RX ADMIN — INSULIN GLARGINE 15 UNIT(S): 100 INJECTION, SOLUTION SUBCUTANEOUS at 08:31

## 2019-09-29 RX ADMIN — MIDODRINE HYDROCHLORIDE 5 MILLIGRAM(S): 2.5 TABLET ORAL at 21:56

## 2019-09-29 RX ADMIN — HEPARIN SODIUM 7500 UNIT(S): 5000 INJECTION INTRAVENOUS; SUBCUTANEOUS at 21:56

## 2019-09-29 RX ADMIN — CEFEPIME 100 MILLIGRAM(S): 1 INJECTION, POWDER, FOR SOLUTION INTRAMUSCULAR; INTRAVENOUS at 09:29

## 2019-09-29 RX ADMIN — HEPARIN SODIUM 7500 UNIT(S): 5000 INJECTION INTRAVENOUS; SUBCUTANEOUS at 15:23

## 2019-09-29 RX ADMIN — NYSTATIN CREAM 1 APPLICATION(S): 100000 CREAM TOPICAL at 06:31

## 2019-09-29 RX ADMIN — MIDODRINE HYDROCHLORIDE 5 MILLIGRAM(S): 2.5 TABLET ORAL at 15:23

## 2019-09-29 RX ADMIN — Medication 300 MILLIGRAM(S): at 07:46

## 2019-09-29 RX ADMIN — TAMOXIFEN CITRATE 20 MILLIGRAM(S): 20 TABLET, FILM COATED ORAL at 15:23

## 2019-09-29 RX ADMIN — HEPARIN SODIUM 7500 UNIT(S): 5000 INJECTION INTRAVENOUS; SUBCUTANEOUS at 06:31

## 2019-09-29 NOTE — PROGRESS NOTE ADULT - SUBJECTIVE AND OBJECTIVE BOX
cc: fever, sepsis , unclear source     interval hx : patient seen and eval. comfortable. in no acute distress. no fever or chills. states that back pain back to baseline , no numbness or tingling in lower ext, no urinary incontinence.     Vital Signs Last 24 Hrs  T(C): 37 (29 Sep 2019 15:20), Max: 37.3 (28 Sep 2019 21:53)  T(F): 98.6 (29 Sep 2019 15:20), Max: 99.2 (28 Sep 2019 21:53)  HR: 80 (29 Sep 2019 15:20) (72 - 84)  BP: 128/70 (29 Sep 2019 15:20) (106/56 - 128/70)  BP(mean): --  RR: 18 (29 Sep 2019 15:20) (16 - 18)  SpO2: 96% (29 Sep 2019 15:20) (96% - 97%)    General: in nad ,  sitting comfortable in chair,  obese  HEENT: mm moist , neck supple   PULM: Diminished breath sounds B/L,   CVS: Regular rate and rhythm. s1, s2  ABD: Morbidly obese. Soft, nondistended, nontender, BS+  EXT: B/L edema, nontender. Chronic venous stasis changes, mild erythema b/l noted   muscskel: no focal point tenderness on spine, midline old scar present from removal of cyst in past , no open skin or erythema on back   NEURO: Alert and oriented x3, moves all extremities.  Psych: normal mood and affect                            11.2   6.14  )-----------( 186      ( 28 Sep 2019 06:39 )             34.6   09-28    137  |  101  |  28.0<H>  ----------------------------<  148<H>  4.4   |  23.0  |  0.92    Ca    9.0      28 Sep 2019 06:39  Mg     2.2     09-28    TPro  7.1  /  Alb  3.1<L>  /  TBili  0.4  /  DBili  x   /  AST  42<H>  /  ALT  28  /  AlkPhos  77  09-28    < from: CT Lumbar Spine w/ IV Cont (09.28.19 @ 11:19) >    IMPRESSION:    Severe degenerative changes at L3/L4 and L4/L5 as above.    This study is nondiagnostic for evaluation of discitis and osteomyelitis.   MRI of the lumbar spine is the study of choice for evaluation of discitis   and osteomyelitis, and may be obtained as clinically indicated.      < end of copied text >  < from: CT Lumbar Spine w/ IV Cont (09.28.19 @ 11:19) >

## 2019-09-29 NOTE — PROGRESS NOTE ADULT - ASSESSMENT
>Septic shock resolved/ now sepsis / unclear source  source cellulitis lower ext?   Continue antibiotic/ adjust as per ID   -all cultures negative to date  -continue Midodrine 5mg tid     >VIKTORIYA and Rhabdomyolysis / improving   -Monitor renal function     >DM-2 -> a1c 6.8   -Lantus 15 mg every morning, Humalog 5 mg tid  -Insulin sliding scale    >HTN   -Hold all anti-hypertensive for now due to hypotension  -Monitor BP   - c.w midodrine    >Hypothyroidism   -Continue Synthroid 75 mcg daily  -needs TFTs in few weeks     >BPH   -Tamsulosin 0.4mg     > hx of MAIRA   -On CPAP at home. Patient was placed on BiPAP overnight and tolerated well  -BiPAP PRN

## 2019-09-29 NOTE — PROGRESS NOTE ADULT - ASSESSMENT
69 morbidly obese male w/ PMHx DM, hypothyroid, CAD, ICM/ HFrEF s/p BiVICD, PVD, remote prostate CA, Colon CA s/p resection, L breast CA s/p mastectomy (on Tamoxifen-currently being monitored) and recently diagnosed w/ MM ( f/u at [facility], currently being monitored) presented to the ED with fatigue. He supposedly could not get out of his car b/c of fatigue. Denies any chest pain, abd pain, N/V/D/C/fever/chills/dysuria or cough. H/o recurrent falls, last one about a week back when he had a head wound. Also endorses chronic back pain. In ED today he was found to be in shock, BP   74/47mmHG w/ T max 102.7 and lactate 5.3. Received a bolus ~ 3.5L and was started on Levo gtt. On [facility] eval pt was awake, sitting up in bed and did not have any new complaints    Overall CHF, MM, s/p septic shock ? from cellultiis  Cellulitis improving    - Blood cultures NGTD  - CT c/a/p no clear source  - Has been having worsening back pain since fall 2 weeks ago-check CT LS spine  - Procalcitonin level elevated  - ON cefepime and vancomycin --> WILL DE-ESCALATE TO  cefazolin 2 grams Q 8H   will monitor for now    - follow up all outstanding cultures  - trend temperature and WBC curve  - repeat cultures from blood and all sources if febrile.

## 2019-09-29 NOTE — PROGRESS NOTE ADULT - SUBJECTIVE AND OBJECTIVE BOX
[facility]  INFECTIOUS DISEASES AND INTERNAL MEDICINE at [place]  =======================================================  Raheel Monae MD  Diplomates American Board of Internal Medicine and Infectious Diseases  Telephone [phone]  Fax            [phone]  =======================================================    [facility]-[id number]  SRAVANI FRAZIER   follow up for: sepsis, cellulitis  patient seen and examined.     reports less pain in the legs,  LEFT side is worse than right side    I have personally reviewed the labs and data; pertinent labs and data are listed in this note; please see below.   ===================================================  REVIEW OF SYSTEMS:  CONSTITUTIONAL:  No Fever or chills  HEENT:  No diplopia or blurred vision.  No earache, sore throat or runny nose.  CARDIOVASCULAR:  No pressure, squeezing, strangling, tightness, heaviness or aching about the chest, neck, axilla or epigastrium.  RESPIRATORY:  No cough, shortness of breath  GASTROINTESTINAL:  No nausea, vomiting or diarrhea.  GENITOURINARY:  No dysuria, frequency or urgency. No Blood in urine  MUSCULOSKELETAL:  no joint aches, no muscle pain  SKIN:  as noted above  NEUROLOGIC:  No Headaches, seizures or weakness.  PSYCHIATRIC:  No disorder of thought or mood.  ENDOCRINE:  No heat or cold intolerance  HEMATOLOGICAL:  No easy bruising or bleeding.   =======================================================  Allergies  No Known Allergies      Antibiotics:  ceFAZolin   IVPB 2000 milliGRAM(s) IV Intermittent every 8 hours    Other medications:  aspirin enteric coated 81 milliGRAM(s) Oral daily  cholecalciferol 1000 Unit(s) Oral daily  cyanocobalamin 100 micrograms 2 Tablet(s) 2 Tablet(s) Oral daily  dextrose 5%. 1000 milliLiter(s) IV Continuous <Continuous>  dextrose 50% Injectable 12.5 Gram(s) IV Push once  dextrose 50% Injectable 25 Gram(s) IV Push once  dextrose 50% Injectable 25 Gram(s) IV Push once  heparin  Injectable 7500 Unit(s) SubCutaneous every 8 hours  influenza   Vaccine 0.5 milliLiter(s) IntraMuscular once  insulin glargine Injectable (LANTUS) 15 Unit(s) SubCutaneous every morning  insulin lispro (HumaLOG) corrective regimen sliding scale.   SubCutaneous four times a day with meals  insulin lispro Injectable (HumaLOG) 5 Unit(s) SubCutaneous three times a day before meals  levothyroxine 75 MICROGram(s) Oral daily  midodrine 5 milliGRAM(s) Oral every 8 hours  nystatin Cream 1 Application(s) Topical two times a day  tamoxifen 20 milliGRAM(s) Oral daily  tamsulosin 0.4 milliGRAM(s) Oral at bedtime    ======================================================  Physical Exam:  ============  T(F): 98.6 (29 Sep 2019 09:25), Max: 99.2 (28 Sep 2019 21:53)  HR: 72 (29 Sep 2019 09:25)  BP: 106/56 (29 Sep 2019 09:25)  RR: 16 (29 Sep 2019 09:25)  SpO2: 96% (29 Sep 2019 09:25) (95% - 97%)  temp max in last 48H T(F): , Max: 99.3 (09-27-19 @ 22:45)    General:  No acute distress.  OBESE  Eye: Pupils are equal, round and reactive to light, Extraocular movements are intact, Normal conjunctiva.  HENT: Normocephalic, Oral mucosa is moist, No pharyngeal erythema, No sinus tenderness.  Neck: Supple, No lymphadenopathy.  Respiratory: Lungs with fair air entry  Cardiovascular: Normal rate, Regular rhythm,    Gastrointestinal: Soft, Non-tender, Non-distended, Normal bowel sounds.  Genitourinary: No costovertebral angle tenderness.  Lymphatics: No lymphadenopathy neck,   Musculoskeletal: Normal range of motion, Normal strength.  Integumentary:  LEFT and RIGHT lower extremity with erythema.   Neurologic: Alert, Oriented, No focal deficits, Cranial Nerves II-XII are grossly intact.  Psychiatric: Appropriate mood & affect.  =======================================================    Labs:                        11.2   6.14  )-----------( 186      ( 28 Sep 2019 06:39 )             34.6       WBC Count: 6.14 K/uL (09-28-19 @ 06:39)  WBC Count: 9.24 K/uL (09-27-19 @ 05:57)  WBC Count: 19.54 K/uL (09-26-19 @ 03:28)  WBC Count: 28.47 K/uL (09-25-19 @ 06:02)  WBC Count: 15.26 K/uL (09-24-19 @ 17:25)      09-28    137  |  101  |  28.0<H>  ----------------------------<  148<H>  4.4   |  23.0  |  0.92    Ca    9.0      28 Sep 2019 06:39  Mg     2.2     09-28    TPro  7.1  /  Alb  3.1<L>  /  TBili  0.4  /  DBili  x   /  AST  42<H>  /  ALT  28  /  AlkPhos  77  09-28      Culture - Urine (collected 09-24-19 @ 19:18)  Source: .Urine  Final Report (09-25-19 @ 14:01):    No growth    Culture - Blood (collected 09-24-19 @ 17:26)  Source: .Blood    Culture - Blood (collected 09-24-19 @ 17:26)  Source: .Blood      Creatinine, Serum: 0.92 mg/dL (09-28-19 @ 06:39)  Creatinine, Serum: 1.02 mg/dL (09-27-19 @ 05:57)  Creatinine, Serum: 1.34 mg/dL (09-26-19 @ 03:28)  Creatinine, Serum: 1.84 mg/dL (09-25-19 @ 06:02)  Creatinine, Serum: 2.03 mg/dL (09-24-19 @ 17:25)    C-Reactive Protein, Serum: 7.81 mg/dL (09-28-19 @ 06:39)    Sedimentation Rate, Erythrocyte: 55 mm/hr (09-28-19 @ 06:39)      Radiology:  < from: US Duplex Venous Lower Ext Complete, Bilateral (09.25.19 @ 13:34) >  FINDINGS:    There is normal compressibility of the bilateral common femoral, femoral   and popliteal veins.     Doppler examination shows normal spontaneous and phasic flow.    Calf veins not well seen; tibioperoneal trunk patent and compressible   bilaterally. Calf veins not seen more distally.    IMPRESSION:     Limited evaluation of the calf veins, otherwise no evidence of deep   venous thrombosis in either lower extremity.    < end of copied text >    < from: CT Abdomen and Pelvis No Cont (09.25.19 @ 00:14) >  FINDINGS:   Limitations: Limited secondary to streak artifact from the patient's   arms, greatest in the upper abdomen, limiting evaluation of the liver,   biliary tree, and pancreas.    Liver: Enlarged. No mass.  Gallbladder and bile ducts: Cholelithiasis. No biliary ductal dilatation.  Pancreas: Limited. Mild fatty infiltration.  Spleen: Normal size. No mass.  Adrenals: Unremarkable.  Kidneys and ureters: Normal size. No hydronephrosis. 1-2 mm   nonobstructing calculus in the mid right kidney.    Stomach and bowel: No bowel obstruction. Surgical sutures are   present along the stomach. Appendix is normal.  Intraperitoneal space: No ascites or free air.  Vasculature: Abdominal aorta normal in caliber with mild calcified plaque.  Retroperitoneum/lymph nodes: No enlarged lymph nodes. Mild infiltration   of the fat at the superior aspect of the left hemipelvis adjacent to the   iliac vasculature (series 3 image 270).    Bladder: Unremarkable.  Reproductive: The prostate is diminutive or absent.  Bones: Degenerative changes in the spine. No aggressive osseous lesion.  Abdominal wall: Diastases of the anterior abdominal wall at the   umbilicus/small fat-containing hernia.      IMPRESSION:    Slightly limited evaluation of the upper abdomen secondary to streak   artifact from patient's arms.    No hydronephrosis.    Mild infiltration of the retroperitoneal fat at the superior aspect of   the left hemipelvis of uncertain etiology, possibly reflecting   inflammation.    < end of copied text >    < from: CT Head No Cont (09.25.19 @ 00:14) >      IMPRESSION:   No acute intracranial abnormality. Changes of an acute infarct may not be   visible on CT for up to 24 to 48 hours. MRI is more sensitive for the   evaluation of an acute infarction. Preliminary report provided by [facility]   TREVER VALENZUELA M.D.;[facility] RADIOLOGIST.        < end of copied text >

## 2019-09-29 NOTE — PHARMACOTHERAPY INTERVENTION NOTE - COMMENTS
Spoke to patient at bedside. Referenced medication bottles brought in by patient.
Sepsis
Vancomycin trough ordered
Vancomycin trough ordered
vanco level 7.8 increase freq to q12

## 2019-09-30 ENCOUNTER — TRANSCRIPTION ENCOUNTER (OUTPATIENT)
Age: 69
End: 2019-09-30

## 2019-09-30 VITALS
RESPIRATION RATE: 20 BRPM | OXYGEN SATURATION: 96 % | HEART RATE: 85 BPM | DIASTOLIC BLOOD PRESSURE: 68 MMHG | SYSTOLIC BLOOD PRESSURE: 117 MMHG

## 2019-09-30 LAB
ANION GAP SERPL CALC-SCNC: 12 MMOL/L — SIGNIFICANT CHANGE UP (ref 5–17)
BUN SERPL-MCNC: 27 MG/DL — HIGH (ref 8–20)
CALCIUM SERPL-MCNC: 9.3 MG/DL — SIGNIFICANT CHANGE UP (ref 8.6–10.2)
CHLORIDE SERPL-SCNC: 99 MMOL/L — SIGNIFICANT CHANGE UP (ref 98–107)
CO2 SERPL-SCNC: 24 MMOL/L — SIGNIFICANT CHANGE UP (ref 22–29)
CREAT SERPL-MCNC: 0.91 MG/DL — SIGNIFICANT CHANGE UP (ref 0.5–1.3)
GLUCOSE BLDC GLUCOMTR-MCNC: 189 MG/DL — HIGH (ref 70–99)
GLUCOSE SERPL-MCNC: 193 MG/DL — HIGH (ref 70–115)
HCT VFR BLD CALC: 34.2 % — LOW (ref 39–50)
HGB BLD-MCNC: 10.9 G/DL — LOW (ref 13–17)
MCHC RBC-ENTMCNC: 31.9 GM/DL — LOW (ref 32–36)
MCHC RBC-ENTMCNC: 32 PG — SIGNIFICANT CHANGE UP (ref 27–34)
MCV RBC AUTO: 100.3 FL — HIGH (ref 80–100)
PLATELET # BLD AUTO: 212 K/UL — SIGNIFICANT CHANGE UP (ref 150–400)
POTASSIUM SERPL-MCNC: 4.8 MMOL/L — SIGNIFICANT CHANGE UP (ref 3.5–5.3)
POTASSIUM SERPL-SCNC: 4.8 MMOL/L — SIGNIFICANT CHANGE UP (ref 3.5–5.3)
RBC # BLD: 3.41 M/UL — LOW (ref 4.2–5.8)
RBC # FLD: 14.8 % — HIGH (ref 10.3–14.5)
SODIUM SERPL-SCNC: 135 MMOL/L — SIGNIFICANT CHANGE UP (ref 135–145)
WBC # BLD: 7.39 K/UL — SIGNIFICANT CHANGE UP (ref 3.8–10.5)
WBC # FLD AUTO: 7.39 K/UL — SIGNIFICANT CHANGE UP (ref 3.8–10.5)

## 2019-09-30 PROCEDURE — 74176 CT ABD & PELVIS W/O CONTRAST: CPT

## 2019-09-30 PROCEDURE — 96367 TX/PROPH/DG ADDL SEQ IV INF: CPT

## 2019-09-30 PROCEDURE — 85610 PROTHROMBIN TIME: CPT

## 2019-09-30 PROCEDURE — 72132 CT LUMBAR SPINE W/DYE: CPT

## 2019-09-30 PROCEDURE — 84436 ASSAY OF TOTAL THYROXINE: CPT

## 2019-09-30 PROCEDURE — 81001 URINALYSIS AUTO W/SCOPE: CPT

## 2019-09-30 PROCEDURE — 71045 X-RAY EXAM CHEST 1 VIEW: CPT

## 2019-09-30 PROCEDURE — 83036 HEMOGLOBIN GLYCOSYLATED A1C: CPT

## 2019-09-30 PROCEDURE — 83880 ASSAY OF NATRIURETIC PEPTIDE: CPT

## 2019-09-30 PROCEDURE — 87581 M.PNEUMON DNA AMP PROBE: CPT

## 2019-09-30 PROCEDURE — 87633 RESP VIRUS 12-25 TARGETS: CPT

## 2019-09-30 PROCEDURE — 86803 HEPATITIS C AB TEST: CPT

## 2019-09-30 PROCEDURE — 85730 THROMBOPLASTIN TIME PARTIAL: CPT

## 2019-09-30 PROCEDURE — 96361 HYDRATE IV INFUSION ADD-ON: CPT

## 2019-09-30 PROCEDURE — 93970 EXTREMITY STUDY: CPT

## 2019-09-30 PROCEDURE — 82550 ASSAY OF CK (CPK): CPT

## 2019-09-30 PROCEDURE — 85027 COMPLETE CBC AUTOMATED: CPT

## 2019-09-30 PROCEDURE — 93005 ELECTROCARDIOGRAM TRACING: CPT

## 2019-09-30 PROCEDURE — 83605 ASSAY OF LACTIC ACID: CPT

## 2019-09-30 PROCEDURE — 96375 TX/PRO/DX INJ NEW DRUG ADDON: CPT

## 2019-09-30 PROCEDURE — 96365 THER/PROPH/DIAG IV INF INIT: CPT

## 2019-09-30 PROCEDURE — 82533 TOTAL CORTISOL: CPT

## 2019-09-30 PROCEDURE — 82962 GLUCOSE BLOOD TEST: CPT

## 2019-09-30 PROCEDURE — 36415 COLL VENOUS BLD VENIPUNCTURE: CPT

## 2019-09-30 PROCEDURE — 87086 URINE CULTURE/COLONY COUNT: CPT

## 2019-09-30 PROCEDURE — 94760 N-INVAS EAR/PLS OXIMETRY 1: CPT

## 2019-09-30 PROCEDURE — 87798 DETECT AGENT NOS DNA AMP: CPT

## 2019-09-30 PROCEDURE — 99239 HOSP IP/OBS DSCHRG MGMT >30: CPT

## 2019-09-30 PROCEDURE — 85652 RBC SED RATE AUTOMATED: CPT

## 2019-09-30 PROCEDURE — 80053 COMPREHEN METABOLIC PANEL: CPT

## 2019-09-30 PROCEDURE — 80048 BASIC METABOLIC PNL TOTAL CA: CPT

## 2019-09-30 PROCEDURE — 84145 PROCALCITONIN (PCT): CPT

## 2019-09-30 PROCEDURE — 86140 C-REACTIVE PROTEIN: CPT

## 2019-09-30 PROCEDURE — 93306 TTE W/DOPPLER COMPLETE: CPT

## 2019-09-30 PROCEDURE — 84100 ASSAY OF PHOSPHORUS: CPT

## 2019-09-30 PROCEDURE — 87040 BLOOD CULTURE FOR BACTERIA: CPT

## 2019-09-30 PROCEDURE — 94660 CPAP INITIATION&MGMT: CPT

## 2019-09-30 PROCEDURE — 84480 ASSAY TRIIODOTHYRONINE (T3): CPT

## 2019-09-30 PROCEDURE — 97530 THERAPEUTIC ACTIVITIES: CPT

## 2019-09-30 PROCEDURE — 87486 CHLMYD PNEUM DNA AMP PROBE: CPT

## 2019-09-30 PROCEDURE — 97163 PT EVAL HIGH COMPLEX 45 MIN: CPT

## 2019-09-30 PROCEDURE — 70450 CT HEAD/BRAIN W/O DYE: CPT

## 2019-09-30 PROCEDURE — 99285 EMERGENCY DEPT VISIT HI MDM: CPT | Mod: 25

## 2019-09-30 PROCEDURE — 71250 CT THORAX DX C-: CPT

## 2019-09-30 PROCEDURE — 84484 ASSAY OF TROPONIN QUANT: CPT

## 2019-09-30 PROCEDURE — 87521 HEPATITIS C PROBE&RVRS TRNSC: CPT

## 2019-09-30 PROCEDURE — 99232 SBSQ HOSP IP/OBS MODERATE 35: CPT

## 2019-09-30 PROCEDURE — 82553 CREATINE MB FRACTION: CPT

## 2019-09-30 PROCEDURE — 80202 ASSAY OF VANCOMYCIN: CPT

## 2019-09-30 PROCEDURE — 83735 ASSAY OF MAGNESIUM: CPT

## 2019-09-30 PROCEDURE — 73701 CT LOWER EXTREMITY W/DYE: CPT

## 2019-09-30 PROCEDURE — 84443 ASSAY THYROID STIM HORMONE: CPT

## 2019-09-30 PROCEDURE — 97116 GAIT TRAINING THERAPY: CPT

## 2019-09-30 RX ORDER — CHOLECALCIFEROL (VITAMIN D3) 125 MCG
1 CAPSULE ORAL
Qty: 0 | Refills: 0 | DISCHARGE

## 2019-09-30 RX ORDER — CEPHALEXIN 500 MG
1 CAPSULE ORAL
Qty: 16 | Refills: 0
Start: 2019-09-30 | End: 2019-10-03

## 2019-09-30 RX ORDER — ACETAMINOPHEN 500 MG
2 TABLET ORAL
Qty: 0 | Refills: 0 | DISCHARGE
Start: 2019-09-30

## 2019-09-30 RX ORDER — SPIRONOLACTONE 25 MG/1
1 TABLET, FILM COATED ORAL
Qty: 0 | Refills: 0 | DISCHARGE

## 2019-09-30 RX ORDER — CARVEDILOL PHOSPHATE 80 MG/1
1 CAPSULE, EXTENDED RELEASE ORAL
Qty: 0 | Refills: 0 | DISCHARGE
Start: 2019-09-30

## 2019-09-30 RX ORDER — CARVEDILOL PHOSPHATE 80 MG/1
1 CAPSULE, EXTENDED RELEASE ORAL
Qty: 0 | Refills: 0 | DISCHARGE

## 2019-09-30 RX ORDER — LEVOTHYROXINE SODIUM 125 MCG
1 TABLET ORAL
Qty: 0 | Refills: 0 | DISCHARGE
Start: 2019-09-30

## 2019-09-30 RX ORDER — MIDODRINE HYDROCHLORIDE 2.5 MG/1
10 TABLET ORAL EVERY 8 HOURS
Refills: 0 | Status: DISCONTINUED | OUTPATIENT
Start: 2019-09-30 | End: 2019-09-30

## 2019-09-30 RX ORDER — SACUBITRIL AND VALSARTAN 24; 26 MG/1; MG/1
1 TABLET, FILM COATED ORAL
Qty: 0 | Refills: 0 | DISCHARGE

## 2019-09-30 RX ORDER — MIDODRINE HYDROCHLORIDE 2.5 MG/1
1 TABLET ORAL
Qty: 90 | Refills: 0
Start: 2019-09-30 | End: 2019-10-29

## 2019-09-30 RX ADMIN — Medication 5 UNIT(S): at 12:50

## 2019-09-30 RX ADMIN — NYSTATIN CREAM 1 APPLICATION(S): 100000 CREAM TOPICAL at 06:46

## 2019-09-30 RX ADMIN — HEPARIN SODIUM 7500 UNIT(S): 5000 INJECTION INTRAVENOUS; SUBCUTANEOUS at 06:46

## 2019-09-30 RX ADMIN — Medication 75 MICROGRAM(S): at 06:46

## 2019-09-30 RX ADMIN — MIDODRINE HYDROCHLORIDE 5 MILLIGRAM(S): 2.5 TABLET ORAL at 06:46

## 2019-09-30 RX ADMIN — Medication 5 UNIT(S): at 08:55

## 2019-09-30 RX ADMIN — Medication 100 MILLIGRAM(S): at 00:04

## 2019-09-30 RX ADMIN — Medication 100 MILLIGRAM(S): at 15:53

## 2019-09-30 RX ADMIN — Medication 650 MILLIGRAM(S): at 00:20

## 2019-09-30 RX ADMIN — Medication 1000 UNIT(S): at 12:50

## 2019-09-30 RX ADMIN — Medication 81 MILLIGRAM(S): at 12:50

## 2019-09-30 RX ADMIN — TAMOXIFEN CITRATE 20 MILLIGRAM(S): 20 TABLET, FILM COATED ORAL at 12:50

## 2019-09-30 RX ADMIN — MIDODRINE HYDROCHLORIDE 10 MILLIGRAM(S): 2.5 TABLET ORAL at 15:53

## 2019-09-30 RX ADMIN — INSULIN GLARGINE 15 UNIT(S): 100 INJECTION, SOLUTION SUBCUTANEOUS at 09:00

## 2019-09-30 RX ADMIN — Medication 2: at 12:51

## 2019-09-30 RX ADMIN — Medication 100 MILLIGRAM(S): at 06:52

## 2019-09-30 RX ADMIN — Medication 2: at 08:55

## 2019-09-30 NOTE — DISCHARGE NOTE NURSING/CASE MANAGEMENT/SOCIAL WORK - NSDCPEWEB_GEN_ALL_CORE
[facility] for Tobacco Control website --- [email or web address] website --- [email or web address] [email or web address]

## 2019-09-30 NOTE — PROGRESS NOTE ADULT - SUBJECTIVE AND OBJECTIVE BOX
[facility]  INFECTIOUS DISEASES AND INTERNAL MEDICINE at [place]  =======================================================  Raheel Monae MD  Diplomates American Board of Internal Medicine and Infectious Diseases  Tel: [phone]      Fax: [phone]  =======================================================    SRAVANI FRAZIER [id number]    Follow up: s/p sepstic shock likely LLE cellulitis. afebrile and stable. feels well    Allergies:  No Known Allergies      Medications:  acetaminophen   Tablet .. 650 milliGRAM(s) Oral every 6 hours PRN  aspirin enteric coated 81 milliGRAM(s) Oral daily  bisacodyl Suppository 10 milliGRAM(s) Rectal daily PRN  ceFAZolin   IVPB 2000 milliGRAM(s) IV Intermittent every 8 hours  cholecalciferol 1000 Unit(s) Oral daily  cyanocobalamin 100 micrograms 2 Tablet(s) 2 Tablet(s) Oral daily  dextrose 40% Gel 15 Gram(s) Oral once PRN  dextrose 5%. 1000 milliLiter(s) IV Continuous <Continuous>  dextrose 50% Injectable 12.5 Gram(s) IV Push once  dextrose 50% Injectable 25 Gram(s) IV Push once  dextrose 50% Injectable 25 Gram(s) IV Push once  glucagon  Injectable 1 milliGRAM(s) IntraMuscular once PRN  guaiFENesin   Syrup  (Sugar-Free) 100 milliGRAM(s) Oral every 6 hours PRN  heparin  Injectable 7500 Unit(s) SubCutaneous every 8 hours  influenza   Vaccine 0.5 milliLiter(s) IntraMuscular once  insulin glargine Injectable (LANTUS) 15 Unit(s) SubCutaneous every morning  insulin lispro (HumaLOG) corrective regimen sliding scale.   SubCutaneous four times a day with meals  insulin lispro Injectable (HumaLOG) 5 Unit(s) SubCutaneous three times a day before meals  levothyroxine 75 MICROGram(s) Oral daily  midodrine 10 milliGRAM(s) Oral every 8 hours  nystatin Cream 1 Application(s) Topical two times a day  tamoxifen 20 milliGRAM(s) Oral daily            REVIEW OF SYSTEMS:  CONSTITUTIONAL:  No Fever or chills  HEENT:   No diplopia or blurred vision.  No earache, sore throat or runny nose.  CARDIOVASCULAR:  No pressure, squeezing, strangling, tightness, heaviness or aching about the chest, neck, axilla or epigastrium.  RESPIRATORY:  No cough, shortness of breath  GASTROINTESTINAL:  No nausea, vomiting or diarrhea.  GENITOURINARY:  No dysuria, frequency or urgency. No Blood in urine  MUSCULOSKELETAL:  no joint aches, no muscle pain  SKIN:  No change in skin, hair or nails.  NEUROLOGIC:  No Headaches, seizures or weakness.  PSYCHIATRIC:  No disorder of thought or mood.  ENDOCRINE:  No heat or cold intolerance  HEMATOLOGICAL:  No easy bruising or bleeding.            Physical Exam:  ICU Vital Signs Last 24 Hrs  T(C): 37 (30 Sep 2019 09:37), Max: 37.5 (29 Sep 2019 21:51)  T(F): 98.6 (30 Sep 2019 09:37), Max: 99.5 (29 Sep 2019 21:51)  HR: 83 (30 Sep 2019 09:37) (63 - 83)  BP: 98/58 (30 Sep 2019 09:37) (98/58 - 128/70)  BP(mean): --  ABP: --  ABP(mean): --  RR: 18 (30 Sep 2019 09:37) (18 - 18)  SpO2: 98% (30 Sep 2019 09:37) (94% - 98%)      GEN: NAD, pleasant  HEENT: normocephalic and atraumatic. EOMI. PERRL.  Anicteric   NECK: Supple.   LUNGS: Clear to auscultation.  HEART: Regular rate and rhythm without murmur. + ICd  ABDOMEN: Soft, nontender, and nondistended.  Positive bowel sounds.    : No CVA tenderness  EXTREMITIES: Without any edema.  MSK: no joint swelling  NEUROLOGIC: Cranial nerves II through XII are grossly intact. No focal deficits  PSYCHIATRIC: Appropriate affect .  SKIN: b/l Le chronic venous stasis,no warmth      Labs:  09-30    135  |  99  |  27.0<H>  ----------------------------<  193<H>  4.8   |  24.0  |  0.91    Ca    9.3      30 Sep 2019 06:19                            10.9   7.39  )-----------( 212      ( 30 Sep 2019 06:19 )             34.2                 CAPILLARY BLOOD GLUCOSE      POCT Blood Glucose.: 189 mg/dL (30 Sep 2019 08:13)  POCT Blood Glucose.: 187 mg/dL (29 Sep 2019 21:23)  POCT Blood Glucose.: 264 mg/dL (29 Sep 2019 17:01)        RECENT CULTURES:  09-25 @ 20:28          NotDetec  09-24 @ 19:18 .Urine     No growth        09-24 @ 17:26 .Blood     No growth at 5 days.      < from: CT Lumbar Spine w/ IV Cont (09.28.19 @ 11:19) >    IMPRESSION:    Severe degenerative changes at L3/L4 and L4/L5 as above.    This study is nondiagnostic for evaluation of discitis and osteomyelitis.   MRI of the lumbar spine is the study of choice for evaluation of discitis   and osteomyelitis, and may be obtained as clinically indicated.    < end of copied text >    < from: CT Lower Extremity w/ IV Cont, Left (09.28.19 @ 11:19) >  FINDINGS:    There is moderate circumferential subcutaneous soft tissue edema and skin   thickening that ismost prominent at the level of the ankle extending   into the dorsum of the midfoot. This appears compatible with cellulitis.   There is no peripherally enhancing fluid collection to suggest the   presence of an abscess. The muscle and fascial planesappear intact.    The knee and ankle articulations are intact.    Limited imaging of the right lower extremity demonstrates   similar-appearing soft tissue findings compatible with cellulitis.    IMPRESSION:    Marked left lower extremity soft tissueswelling without evidence of a   soft tissue abscess. This appears compatible with cellulitis.   Similar-appearing partially imaged findings of the right lower extremity.   No osseous CT abdomen with a.    < end of copied text >

## 2019-09-30 NOTE — PROGRESS NOTE ADULT - PROVIDER SPECIALTY LIST ADULT
Critical Care
Hospitalist
Infectious Disease
Infectious Disease
Internal Medicine
Internal Medicine
Hospitalist

## 2019-09-30 NOTE — DISCHARGE NOTE NURSING/CASE MANAGEMENT/SOCIAL WORK - PATIENT PORTAL LINK FT
You can access the FollowMyHealth Patient Portal offered by [facility] by registering at the following website: [email or web address]. By joining [employer]’s FollowMyHealth portal, you will also be able to view your health information using other applications (apps) compatible with our system.

## 2019-09-30 NOTE — DISCHARGE NOTE PROVIDER - HOSPITAL COURSE
70 y/o M w/ a PMHx of prostate CA, colon CA s/p resection, L breast CA s/p resection (on Tamoxifen), recently diagnosed multiple myeloma (followed at [facility]), ischemic CM s/p BiV ICD, CAD, T2DM, HTN, MAIRA on home CPAP, morbid obesity, who presented to the ED c/o worsening fatigue. Found to be in septic shock 2/2 unknown source, with VIKTORIYA, anion gap metabolic acidosis, rhabdomyolysis. Pt remained hypotensive despite IVF resuscitation and was admitted to the MICU for vasopressor support. Pt developed worsening hyperglycemia requiring insulin gtt.  Pt is now off levophed and is midodrine 10 mg q8 hrs. LE doppler negative. Patient downgraded to medicine and continued on iv abx.     Blood cultures NGTD, CT c/a/p no clear source, Has been having worsening back pain since fall 2 weeks ago- CT LS spine-multilevel degenerative disc disease, now pain at baselien as per patient. CT LLE- lower extremity soft tissue swelling. received cefazolin 2 grams Q 8H , will switch to  4 more days of keflex 500 mg po q6h     f/u in ID office in 2 weeks. Keep legs wrapped/elevated. lasix, and acei are held for now . patient to follow up with primary care in 1-3 days for resuming meds if bp allows.     today patient is awake and alert x 3, comfortable. vs stable. evaluated by PT. patient will be dcd home with home care and bariatric walker.         Vital Signs Last 24 Hrs    T(C): 37 (30 Sep 2019 09:37), Max: 37.5 (29 Sep 2019 21:51)    T(F): 98.6 (30 Sep 2019 09:37), Max: 99.5 (29 Sep 2019 21:51)    HR: 85 (30 Sep 2019 15:49) (63 - 85)    BP: 117/68 (30 Sep 2019 15:49) (98/58 - 117/68)    BP(mean): --    RR: 20 (30 Sep 2019 15:49) (18 - 20)    SpO2: 96% (30 Sep 2019 15:49) (94% - 98%)        General: in nad ,  sitting comfortable in chair,  obese    HEENT: mm moist , neck supple     PULM: Diminished breath sounds B/L,     CVS: Regular rate and rhythm. s1, s2    ABD: Morbidly obese. Soft, nondistended, nontender, BS+    EXT: B/L edema, nontender. Chronic venous stasis changes, mild erythema b/l noted     muscskel: no focal point tenderness on spine, midline old scar present from removal of cyst in past , no open skin or erythema on back     NEURO: Alert and oriented x3, moves all extremities.    Psych: normal mood and affect        time spent for this discharge 43 mins, 68 y/o M w/ a PMHx of prostate CA, colon CA s/p resection, L breast CA s/p resection (on Tamoxifen), recently diagnosed multiple myeloma (followed at [facility]), ischemic CM s/p BiV ICD, CAD, T2DM, HTN, MAIRA on home CPAP, morbid obesity, who presented to the ED c/o worsening fatigue. Found to be in septic shock 2/2 unknown source, with VIKTORIYA, anion gap metabolic acidosis, rhabdomyolysis. Pt remained hypotensive despite IVF resuscitation and was admitted to the MICU for vasopressor support. Pt developed worsening hyperglycemia requiring insulin gtt.  Pt is now off levophed and is midodrine 10 mg q8 hrs. LE doppler negative. Patient downgraded to medicine and continued on iv abx.     Blood cultures NGTD, CT c/a/p no clear source, Has been having worsening back pain since fall 2 weeks ago- CT LS spine-multilevel degenerative disc disease, now pain at baselien as per patient. CT LLE- lower extremity soft tissue swelling. received cefazolin 2 grams Q 8H , will switch to  4 more days of keflex 500 mg po q6h.     patient also has weakly positive hepatitis c antibody. patient to follow up with primary care for follow up testing.      f/u in ID office in 2 weeks. Keep legs wrapped/elevated. lasix, and acei are held for now . patient to follow up with primary care in 1-3 days for resuming meds if bp allows.     today patient is awake and alert x 3, comfortable. vs stable. evaluated by PT. patient will be dcd home with home care and bariatric walker.         Vital Signs Last 24 Hrs    T(C): 37 (30 Sep 2019 09:37), Max: 37.5 (29 Sep 2019 21:51)    T(F): 98.6 (30 Sep 2019 09:37), Max: 99.5 (29 Sep 2019 21:51)    HR: 85 (30 Sep 2019 15:49) (63 - 85)    BP: 117/68 (30 Sep 2019 15:49) (98/58 - 117/68)    BP(mean): --    RR: 20 (30 Sep 2019 15:49) (18 - 20)    SpO2: 96% (30 Sep 2019 15:49) (94% - 98%)        General: in nad ,  sitting comfortable in chair,  obese    HEENT: mm moist , neck supple     PULM: Diminished breath sounds B/L,     CVS: Regular rate and rhythm. s1, s2    ABD: Morbidly obese. Soft, nondistended, nontender, BS+    EXT: B/L edema, nontender. Chronic venous stasis changes, mild erythema b/l noted     muscskel: no focal point tenderness on spine, midline old scar present from removal of cyst in past , no open skin or erythema on back     NEURO: Alert and oriented x3, moves all extremities.    Psych: normal mood and affect        time spent for this discharge 43 mins,

## 2019-09-30 NOTE — DISCHARGE NOTE PROVIDER - PROVIDER TOKENS
FREE:[LAST:[primary care],PHONE:[(   )    -],FAX:[(   )    -],FOLLOWUP:[1-3 days]],FREE:[LAST:[ali],FIRST:[letitia],PHONE:[(   )    -],FAX:[(   )    -],ADDRESS:[infectious disease.   [address] [place] [address], [place] [address]   phone [phone]],FOLLOWUP:[2 weeks]]

## 2019-09-30 NOTE — PROGRESS NOTE ADULT - ASSESSMENT
69 morbidly obese male w/ PMHx DM, hypothyroid, CAD, ICM/ HFrEF s/p BiVICD, PVD, remote prostate CA, Colon CA s/p resection, L breast CA s/p mastectomy (on Tamoxifen-currently being monitored) and recently diagnosed w/ MM ( f/u at [facility], currently being monitored) presented to the ED with fatigue. He supposedly could not get out of his car b/c of fatigue. Denies any chest pain, abd pain, N/V/D/C/fever/chills/dysuria or cough. H/o recurrent falls, last one about a week back when he had a head wound. Also endorses chronic back pain. In ED today he was found to be in shock, BP   74/47mmHG w/ T max 102.7 and lactate 5.3. Received a bolus ~ 3.5L and was started on Levo gtt. On [facility] eval pt was awake, sitting up in bed and did not have any new complaints    Overall CHF, MM, s/p septic shock ? from cellultiis  Cellulitis improving    - Blood cultures NGTD  - CT c/a/p no clear source  - Has been having worsening back pain since fall 2 weeks ago- CT LS spine-multilevel degenerative disc disease  - Procalcitonin level elevated  - CT LLE- lower extremity soft tissue swelling  - ON cefazolin 2 grams Q 8H   - can complete abx with 4 more days of keflex 500 mg po q6h  - f/u in ID office in 2 weeks  - Keep legs wrapped/elevated      Will sign off. Please call if questions arise.

## 2019-09-30 NOTE — DISCHARGE NOTE PROVIDER - NSDCCPCAREPLAN_GEN_ALL_CORE_FT
PRINCIPAL DISCHARGE DIAGNOSIS  Diagnosis: Septic shock  Assessment and Plan of Treatment: resolved. hold blood pressure meds till you follow up with primary care in 1-3 days. you have been started on new medicine midodrine. take medicine as prescribed.      SECONDARY DISCHARGE DIAGNOSES  Diagnosis: Lower extremity cellulitis  Assessment and Plan of Treatment: complete antibiotics as advised.   wrap legs with ACE warps.  follow up with infectious disease doctor in 2 weeks. PRINCIPAL DISCHARGE DIAGNOSIS  Diagnosis: Septic shock  Assessment and Plan of Treatment: resolved. hold blood pressure meds till you follow up with primary care in 1-3 days. you have been started on new medicine midodrine. take medicine as prescribed.      SECONDARY DISCHARGE DIAGNOSES  Diagnosis: Lower extremity cellulitis  Assessment and Plan of Treatment: complete antibiotics as advised.   wrap legs with ACE warps.  follow up with infectious disease doctor in 2 weeks.    Diagnosis: Hepatitis C antibody test positive  Assessment and Plan of Treatment: you have weakly positive hepatitis c antibody on routine testing, you need follow up testing with primary care.

## 2019-09-30 NOTE — DISCHARGE NOTE PROVIDER - CARE PROVIDER_API CALL
primary care,   Phone: (   )    -  Fax: (   )    -  Follow Up Time: 1-3 days    letitia antunez  infectious disease.   [address] [facility], [place] [address]   phone [phone]  Phone: (   )    -  Fax: (   )    -  Follow Up Time: 2 weeks

## 2019-09-30 NOTE — DISCHARGE NOTE NURSING/CASE MANAGEMENT/SOCIAL WORK - NSDCPEEMAIL_GEN_ALL_CORE
[facility] email [email or web address]

## 2019-10-07 LAB
GLUCOSE BLDC GLUCOMTR-MCNC: 191 MG/DL — HIGH (ref 70–99)
GLUCOSE BLDC GLUCOMTR-MCNC: 200 MG/DL — HIGH (ref 70–99)
GLUCOSE BLDC GLUCOMTR-MCNC: 224 MG/DL — HIGH (ref 70–99)
GLUCOSE BLDC GLUCOMTR-MCNC: 233 MG/DL — HIGH (ref 70–99)
GLUCOSE BLDC GLUCOMTR-MCNC: 247 MG/DL — HIGH (ref 70–99)
GLUCOSE BLDC GLUCOMTR-MCNC: 252 MG/DL — HIGH (ref 70–99)

## 2019-11-18 ENCOUNTER — MEDICATION RENEWAL (OUTPATIENT)
Age: 69
End: 2019-11-18

## 2019-11-20 ENCOUNTER — MEDICATION RENEWAL (OUTPATIENT)
Age: 69
End: 2019-11-20

## 2020-01-06 ENCOUNTER — MEDICATION RENEWAL (OUTPATIENT)
Age: 70
End: 2020-01-06

## 2020-01-07 ENCOUNTER — RESULT CHARGE (OUTPATIENT)
Age: 70
End: 2020-01-07

## 2020-01-07 ENCOUNTER — APPOINTMENT (OUTPATIENT)
Dept: ENDOCRINOLOGY | Facility: CLINIC | Age: 70
End: 2020-01-07
Payer: COMMERCIAL

## 2020-01-07 VITALS
WEIGHT: 315 LBS | BODY MASS INDEX: 45.1 KG/M2 | HEART RATE: 79 BPM | HEIGHT: 70 IN | SYSTOLIC BLOOD PRESSURE: 128 MMHG | DIASTOLIC BLOOD PRESSURE: 88 MMHG

## 2020-01-07 DIAGNOSIS — Z79.899 OTHER LONG TERM (CURRENT) DRUG THERAPY: ICD-10-CM

## 2020-01-07 PROBLEM — E11.9 TYPE 2 DIABETES MELLITUS WITHOUT COMPLICATIONS: Chronic | Status: ACTIVE | Noted: 2019-09-24

## 2020-01-07 PROBLEM — I10 ESSENTIAL (PRIMARY) HYPERTENSION: Chronic | Status: ACTIVE | Noted: 2019-09-24

## 2020-01-07 PROBLEM — C50.919 MALIGNANT NEOPLASM OF UNSPECIFIED SITE OF UNSPECIFIED FEMALE BREAST: Chronic | Status: ACTIVE | Noted: 2019-09-24

## 2020-01-07 PROBLEM — C61 MALIGNANT NEOPLASM OF PROSTATE: Chronic | Status: ACTIVE | Noted: 2019-09-24

## 2020-01-07 LAB
GLUCOSE BLDC GLUCOMTR-MCNC: 70
GLUCOSE BLDC GLUCOMTR-MCNC: 73
GLUCOSE BLDC GLUCOMTR-MCNC: 90

## 2020-01-07 PROCEDURE — 99214 OFFICE O/P EST MOD 30 MIN: CPT | Mod: 25

## 2020-01-07 PROCEDURE — 82962 GLUCOSE BLOOD TEST: CPT | Mod: NC

## 2020-01-07 RX ORDER — POTASSIUM CHLORIDE 1500 MG/1
20 TABLET, EXTENDED RELEASE ORAL
Refills: 0 | Status: DISCONTINUED | COMMUNITY
End: 2020-01-07

## 2020-01-07 RX ORDER — ISOSORBIDE MONONITRATE 20 MG/1
20 TABLET ORAL DAILY
Refills: 0 | Status: DISCONTINUED | COMMUNITY
End: 2020-01-07

## 2020-01-07 RX ORDER — SPIRONOLACTONE 25 MG/1
25 TABLET ORAL DAILY
Qty: 90 | Refills: 1 | Status: DISCONTINUED | COMMUNITY
End: 2020-01-07

## 2020-01-07 RX ORDER — LOSARTAN POTASSIUM 50 MG/1
50 TABLET, FILM COATED ORAL DAILY
Refills: 0 | Status: DISCONTINUED | COMMUNITY
End: 2020-01-07

## 2020-01-07 RX ORDER — FUROSEMIDE 20 MG/1
20 TABLET ORAL TWICE DAILY
Refills: 0 | Status: DISCONTINUED | COMMUNITY
End: 2020-01-07

## 2020-01-07 NOTE — REASON FOR VISIT
COWS/CIWA DEFERRED AND VITALS REFUSED

 

Pt laying in bed with eyes closed, COWS/CIWA deferred, to be assessed when pt is awake per 
orders. Vitals refused. Respirations 16, even and unlabored. Safety measures in place. Call 
light within reach. Will continue to monitor. [Follow-Up: _____] : a [unfilled] follow-up visit [FreeTextEntry1] : worsening T2DM, stable thyroid nodules, stable hypothyroid s/p KENT for toxic nodule. last seen 3/2019

## 2020-01-07 NOTE — ASSESSMENT
[FreeTextEntry1] : 1. T2DM with PVD and neuropathy - A1c okay, mild hypoglycemia today\par - pt given juice and cookies and remained in office until sugars improved\par - cont SMBG\par - risks of hypoglycemia spenser with U500 insulin discussed\par - cont current oral meds\par - cont current U500 insulin dose\par - repeat A1c \par \par 2. hypothyroidism s/p KENT ablation - euthyroid on exam and on labs 10.2019\par - cont LT4 75 mcg daily\par - repeat TFTs\par \par 3. hyperlipidemia - improved with statin\par \par 4. thyroid nodules\par - needs updated thyroid sono\par \par \par

## 2020-01-07 NOTE — PHYSICAL EXAM
[No Acute Distress] : no acute distress [Healthy Appearance] : healthy appearance [No Respiratory Distress] : no respiratory distress [Normal Sclera/Conjunctiva] : normal sclera/conjunctiva [Normal Rate and Effort] : normal respiratory rhythm and effort [Clear to Auscultation] : lungs were clear to auscultation bilaterally [Normal Rate] : heart rate was normal  [Normal Bowel Sounds] : normal bowel sounds [Normal S1, S2] : normal S1 and S2 [Not Tender] : non-tender [Soft] : abdomen soft [Acanthosis Nigricans] : acanthosis nigricans [No Clubbing, Cyanosis] : no clubbing  or cyanosis of the fingernails [Not Distended] : not distended [No Motor Deficits] : the motor exam was normal [Cranial Nerves Intact] : cranial nerves 2-12 were intact [Oriented x3] : oriented to person, place, and time [Normal Affect] : the affect was normal [Normal Mood] : the mood was normal [Right Foot Was Examined] : right foot ~C was examined [Left Foot Was Examined] : left foot ~C was examined [Normal] : normal [Vibration Dec.] : diminished vibratory sensation at the level of the toes [Position Sense Dec.] : diminished position sense at the level of the toes [Diminished Throughout Both Feet] : diminished tactile sensation with monofilament testing throughout both feet [Foot Ulcers] : no foot ulcers [de-identified] : walks with cane [de-identified] : bilateral PVD skin changes

## 2020-01-07 NOTE — REVIEW OF SYSTEMS
[Fatigue] : fatigue [Recent Weight Loss (___ Lbs)] : recent [unfilled] ~Ulb weight loss [As Noted in HPI] : as noted in HPI [Pain/Numbness of Digits] : pain/numbness of digits [Chills] : no chills [Fever] : no fever [Recent Weight Gain (___ Lbs)] : no recent weight gain [Blurry Vision] : no blurred vision [Chest Pain] : no chest pain [Palpitations] : no palpitations [Shortness Of Breath] : no shortness of breath [SOB on Exertion] : no shortness of breath during exertion [Vomiting] : no vomiting was observed [Nausea] : no nausea [Constipation] : no constipation [Abdominal Pain] : no abdominal pain [Diarrhea] : no diarrhea [Muscle Cramps] : no muscle cramps [Polyuria] : no polyuria [Nocturia] : no nocturia [Depression] : no depression [Myalgia] : no myalgia  [Polydipsia] : no polydipsia [Anxiety] : no anxiety [FreeTextEntry6] : using CPAP machine

## 2020-01-07 NOTE — DATA REVIEWED
[FreeTextEntry1] : labs 2/2018 TSH 2.33\par \par labs 8/3/18\par Cr 1.06, GFR 72\par LDl chol 103, Tg 153\par A1c 7%\par \par Labs 3/1/19\par Gluc 100, A1c 6.9%\par Cr 1.28, GFR 57\par LDL 59, HDL 33, Tg 124\par TSH 2.80, FT4 0.9\par \par Labs 10/24/19\par Gluc 123, A1c 6.4%\par Cr 0.98. GFR 78\par urine microalb/Cr 8\par LDL 58, HDL 30, Tg 150\par TSH 4.08, FT4 0.8

## 2020-01-07 NOTE — HISTORY OF PRESENT ILLNESS
[FreeTextEntry1] : tried Freestyle Gildardo personal but did not like wearing it\par hospitalized 9/2019 - septic shock from LE cellulitis\par FS 73 today\par \par T2DM\par DM meds:\par U500 (pen) 80 units before BF and 85 before dinner\par Januvia 100 mg daily\par Jardiance 10 mg daily\par MFN 1000 mg BID\par \par SMBG - testing 2x daily before insulin injections, fasting 130-160's, dinner 140-low 200's, bedtime 140-170's. denies hypoglycemia\par Diet - better with diet, low sodium diet\par Complication: no eye exam. neg urine microalb/Cr 10/2019. (+) neuropathy\par -----------------------------------------------------------------------------------------\par Hyperlipidemia - on Atorvastatin 40 mg daily\par -------------------------------------------------------\par Hyperthyroid due to Left toxic nodule s/o KENT therapy 32 mCI 2010 with resultant hypothyroid. Also with small Right thyroid nodules\par Thyroid med - LT4 75 mcg daily on an empty stomach. did not have thyroid sonogram done. denies anterior neck pain/dysphagia or voice changes

## 2020-02-06 ENCOUNTER — APPOINTMENT (OUTPATIENT)
Dept: ENDOCRINOLOGY | Facility: CLINIC | Age: 70
End: 2020-02-06

## 2020-04-08 ENCOUNTER — APPOINTMENT (OUTPATIENT)
Dept: ENDOCRINOLOGY | Facility: CLINIC | Age: 70
End: 2020-04-08
Payer: COMMERCIAL

## 2020-04-08 PROCEDURE — G2012 BRIEF CHECK IN BY MD/QHP: CPT

## 2020-04-08 RX ORDER — BLOOD SUGAR DIAGNOSTIC
STRIP MISCELLANEOUS 3 TIMES DAILY
Qty: 3 | Refills: 1 | Status: ACTIVE | COMMUNITY
Start: 2020-04-08 | End: 1900-01-01

## 2020-04-08 RX ORDER — BLOOD SUGAR DIAGNOSTIC
STRIP MISCELLANEOUS
Qty: 3 | Refills: 1 | Status: ACTIVE | COMMUNITY
Start: 1900-01-01 | End: 1900-01-01

## 2020-08-20 ENCOUNTER — RX RENEWAL (OUTPATIENT)
Age: 70
End: 2020-08-20

## 2020-08-27 ENCOUNTER — RX RENEWAL (OUTPATIENT)
Age: 70
End: 2020-08-27

## 2020-11-09 LAB
HBA1C MFR BLD HPLC: 6.4
LDLC SERPL DIRECT ASSAY-MCNC: 73
MICROALBUMIN/CREAT 24H UR-RTO: 14

## 2020-11-10 ENCOUNTER — APPOINTMENT (OUTPATIENT)
Dept: ENDOCRINOLOGY | Facility: CLINIC | Age: 70
End: 2020-11-10
Payer: COMMERCIAL

## 2020-11-10 VITALS
HEART RATE: 113 BPM | HEIGHT: 70 IN | WEIGHT: 315 LBS | SYSTOLIC BLOOD PRESSURE: 126 MMHG | DIASTOLIC BLOOD PRESSURE: 74 MMHG | BODY MASS INDEX: 45.1 KG/M2

## 2020-11-10 LAB — GLUCOSE BLDC GLUCOMTR-MCNC: 123

## 2020-11-10 PROCEDURE — 99214 OFFICE O/P EST MOD 30 MIN: CPT | Mod: 25

## 2020-11-10 PROCEDURE — 99072 ADDL SUPL MATRL&STAF TM PHE: CPT

## 2020-11-10 PROCEDURE — 82962 GLUCOSE BLOOD TEST: CPT

## 2020-11-10 NOTE — PHYSICAL EXAM
[Alert] : alert [Well Nourished] : well nourished [Obese] : obese [No Acute Distress] : no acute distress [Well Developed] : well developed [Normal Sclera/Conjunctiva] : normal sclera/conjunctiva [EOMI] : extra ocular movement intact [No Proptosis] : no proptosis [Thyroid Not Enlarged] : the thyroid was not enlarged [No Thyroid Nodules] : no palpable thyroid nodules [No Respiratory Distress] : no respiratory distress [No Accessory Muscle Use] : no accessory muscle use [Clear to Auscultation] : lungs were clear to auscultation bilaterally [Normal S1, S2] : normal S1 and S2 [Normal Rate] : heart rate was normal [Regular Rhythm] : with a regular rhythm [Pedal Pulses Normal] : the pedal pulses are present [Normal Anterior Cervical Nodes] : no anterior cervical lymphadenopathy [Normal Posterior Cervical Nodes] : no posterior cervical lymphadenopathy [No Tremors] : no tremors [Oriented x3] : oriented to person, place, and time [Normal Affect] : the affect was normal [Normal Mood] : the mood was normal [Acanthosis Nigricans] : no acanthosis nigricans [de-identified] : B/L LE edema with venous stasis changes [de-identified] : venous stasis skin changes to B/L LE

## 2020-11-10 NOTE — REVIEW OF SYSTEMS
[Fatigue] : fatigue [Polyuria] : polyuria [Pain/Numbness of Digits] : pain/numbness of digits [Recent Weight Gain (___ Lbs)] : no recent weight gain [Recent Weight Loss (___ Lbs)] : no recent weight loss [Blurred Vision] : no blurred vision [Dysphagia] : no dysphagia [Neck Pain] : no neck pain [Dysphonia] : no dysphonia [Chest Pain] : no chest pain [Palpitations] : no palpitations [Shortness Of Breath] : no shortness of breath [Nausea] : no nausea [Constipation] : no constipation [Abdominal Pain] : no abdominal pain [Vomiting] : no vomiting [Diarrhea] : no diarrhea [Joint Pain] : no joint pain [Muscle Weakness] : no muscle weakness [Myalgia] : no myalgia  [Tremors] : no tremors [Depression] : no depression [Anxiety] : no anxiety [Polydipsia] : no polydipsia [FreeTextEntry8] : on diuretic

## 2020-11-10 NOTE — ASSESSMENT
[FreeTextEntry1] : 70 year old male with T2DM, hypothyroidism s/p KENT for toxic nodule, and hyperlipidemia. Glycemic control is acceptable.\par \par 1. T2DM with PVD and neuropathy - A1c 6.4%, episode of hypoglycemia due to delayed meal.\par -Continue Humulin R U500, Metformin, Januvia, and Jardiance.\par -Reviewed importance of appropriate timing of insulin administration.\par -Repeat A1c in 3 months.\par \par 2. Hypothyroidism s/p KENT ablation - mild TSH elevation.\par -Increase LT4 75 mcg to 8 tabs per week.\par -Repeat TFTs in 4-6 weeks.\par \par 3. Hyperlipidemia- LDL at target.\par -Continue statin.\par -Repeat lipids in 3 months.\par \par 4. Thyroid nodules\par -Repeat thyroid sonogram now.\par \par

## 2020-11-10 NOTE — HISTORY OF PRESENT ILLNESS
[FreeTextEntry1] : Type: 2\par Severity: severe\par Duration: diagnosed 30 years ago\par \par Modifying factors: DM meds\par U500 (pen) 80 units before BF and 85 before dinner\par Januvia 100 mg daily\par Jardiance 10 mg daily\par MFN 1000 mg BID\par \par SMBG twice daily, before breakfast and before dinner. Reviewed meter. BG mid-100s. Reports one episode of hypoglycemia last week after taking insulin and then unexpectedly delaying his meal.\par Current , ate a cheeseburger and diet coke for lunch 30 minutes ago.\par \par Associated symptoms\par Eye exam: none recent, history of focal neuropathy. Denies vision changes.\par Foot exam: May 2020, +neuropathy, reports numbness in B/L feet.\par Kidney disease: none\par Heart disease: CHF, MI\par \par Diet: three meals daily, eats a lot of the same each day\par B- toast\par L- cheeseburger, chicken nuggets\par D- hot dogs\par S- ice cream\par Exercise: none\par Weight: stable\par -----------------------------------------------------------------------------------------\par Hyperlipidemia - on Atorvastatin 40 mg daily\par -------------------------------------------------------\par Hyperthyroid due to Left toxic nodule s/o KENT therapy 32 mCI 2010 with resultant hypothyroid. Also with small Right thyroid nodules.\par Current regimen: LT4 75 mcg daily, takes with all other medications in the morning.\par Denies anterior neck pain, dysphagia, and voice changes.\par

## 2021-01-12 ENCOUNTER — RX RENEWAL (OUTPATIENT)
Age: 71
End: 2021-01-12

## 2021-04-26 LAB
HBA1C MFR BLD HPLC: 6.5
LDLC SERPL DIRECT ASSAY-MCNC: 74
MICROALBUMIN/CREAT 24H UR-RTO: 24

## 2021-04-27 ENCOUNTER — APPOINTMENT (OUTPATIENT)
Dept: ENDOCRINOLOGY | Facility: CLINIC | Age: 71
End: 2021-04-27
Payer: COMMERCIAL

## 2021-04-27 VITALS
DIASTOLIC BLOOD PRESSURE: 60 MMHG | HEIGHT: 70 IN | BODY MASS INDEX: 45.1 KG/M2 | OXYGEN SATURATION: 98 % | HEART RATE: 69 BPM | WEIGHT: 315 LBS | SYSTOLIC BLOOD PRESSURE: 110 MMHG

## 2021-04-27 LAB — GLUCOSE BLDC GLUCOMTR-MCNC: 105

## 2021-04-27 PROCEDURE — 82962 GLUCOSE BLOOD TEST: CPT

## 2021-04-27 PROCEDURE — 99072 ADDL SUPL MATRL&STAF TM PHE: CPT

## 2021-04-27 PROCEDURE — 99214 OFFICE O/P EST MOD 30 MIN: CPT | Mod: 25

## 2021-04-27 NOTE — HISTORY OF PRESENT ILLNESS
[FreeTextEntry1] : interval history: multiple myeloma, stage 1, seeing oncologist.\par \par Type: 2\par Severity: severe\par Duration: diagnosed 30 years ago\par \par Modifying factors: DM meds\par U500 (pen) 80 units before BF and 85 before dinner\par Januvia 100 mg daily\par Jardiance 10 mg daily\par MFN 1000 mg BID\par \par Has not tested BG recently.\par Current \par \par Associated symptoms\par Eye exam: none recent, history of focal neuropathy. Denies vision changes.\par Foot exam: May 2020, +neuropathy, reports numbness in B/L feet.\par Kidney disease: none\par Heart disease: CHF, MI\par \par Diet: three meals daily, eats a lot of the same each day\par B- toast\par L- cheeseburger, chicken nuggets\par D- hot dogs\par S- ice cream\par Exercise: none\par Weight: stable\par -----------------------------------------------------------------------------------------\par Hyperlipidemia - on Atorvastatin 40 mg daily\par -------------------------------------------------------\par Hyperthyroid due to Left toxic nodule s/p KENT therapy 32 mCi 2010 with resultant hypothyroid. Also with small Right thyroid nodules.\par Current regimen: LT4 75 mcg daily, sometimes takes after eating breakfast\par Denies anterior neck pain, dysphagia, and voice changes.\par

## 2021-04-27 NOTE — ASSESSMENT
[Levothyroxine] : The patient was instructed to take Levothyroxine on an empty stomach, separate from vitamins, and wait at least 30 minutes before eating [FreeTextEntry1] : 71 year old male with T2DM, hypothyroidism s/p KENT for toxic nodule, and hyperlipidemia. Glycemic control is acceptable.\par \par 1. T2DM with PVD and neuropathy - A1c 6.5%, c/o decreased sensation in B/L feet and loss of balance.\par -Continue Humulin R U500, Metformin, Januvia, and Jardiance.\par -Needs to see opthalmology for diabetic eye exam and podiatry.\par -Recommend diabetic shoes.\par -Repeat A1c in 3 months.\par \par 2. Hypothyroidism s/p KENT ablation - euthyroid on replacement T4.\par -Continue current dose of LT4.\par -Reviewed correct administration- take in AM on empty stomach at least 30 minutes before eating or taking other medications.\par -Repeat TFTs in 3 months.\par \par 3. Hyperlipidemia- LDL at target.\par -Continue statin.\par -Repeat lipids in 3 months.\par \par 4. Thyroid nodules\par -Repeat thyroid sonogram now.\par

## 2021-04-27 NOTE — PHYSICAL EXAM
[Alert] : alert [Well Nourished] : well nourished [Obese] : obese [No Acute Distress] : no acute distress [Well Developed] : well developed [Normal Sclera/Conjunctiva] : normal sclera/conjunctiva [EOMI] : extra ocular movement intact [No Proptosis] : no proptosis [Thyroid Not Enlarged] : the thyroid was not enlarged [No Thyroid Nodules] : no palpable thyroid nodules [No Respiratory Distress] : no respiratory distress [No Accessory Muscle Use] : no accessory muscle use [Clear to Auscultation] : lungs were clear to auscultation bilaterally [Normal S1, S2] : normal S1 and S2 [Normal Rate] : heart rate was normal [Regular Rhythm] : with a regular rhythm [Pedal Pulses Normal] : the pedal pulses are present [Normal Anterior Cervical Nodes] : no anterior cervical lymphadenopathy [No Tremors] : no tremors [Oriented x3] : oriented to person, place, and time [Normal Affect] : the affect was normal [Normal Mood] : the mood was normal [Acanthosis Nigricans] : no acanthosis nigricans [de-identified] : B/L LE edema with venous stasis changes [de-identified] : venous stasis skin changes to B/L LE

## 2021-04-27 NOTE — REASON FOR VISIT
[DM Type 2] : DM Type 2 [Hypothyroidism] : hypothyroidism [Thyroid nodule/ MNG] : thyroid nodule/ MNG

## 2021-04-27 NOTE — REVIEW OF SYSTEMS
[Constipation] : constipation [Pain/Numbness of Digits] : pain/numbness of digits [Recent Weight Gain (___ Lbs)] : no recent weight gain [Recent Weight Loss (___ Lbs)] : no recent weight loss [Blurred Vision] : no blurred vision [Dysphagia] : no dysphagia [Neck Pain] : no neck pain [Chest Pain] : no chest pain [Palpitations] : no palpitations [Shortness Of Breath] : no shortness of breath [Nausea] : no nausea [Abdominal Pain] : no abdominal pain [Vomiting] : no vomiting [Diarrhea] : no diarrhea [Polyuria] : no polyuria [Depression] : no depression [Anxiety] : no anxiety [Polydipsia] : no polydipsia

## 2021-05-27 ENCOUNTER — RX RENEWAL (OUTPATIENT)
Age: 71
End: 2021-05-27

## 2021-05-28 ENCOUNTER — NON-APPOINTMENT (OUTPATIENT)
Age: 71
End: 2021-05-28

## 2021-07-26 LAB
HBA1C MFR BLD HPLC: 6.4
LDLC SERPL DIRECT ASSAY-MCNC: 69

## 2021-07-27 ENCOUNTER — APPOINTMENT (OUTPATIENT)
Dept: ENDOCRINOLOGY | Facility: CLINIC | Age: 71
End: 2021-07-27
Payer: COMMERCIAL

## 2021-07-27 PROCEDURE — 99214 OFFICE O/P EST MOD 30 MIN: CPT | Mod: 95

## 2021-07-27 NOTE — ASSESSMENT
[FreeTextEntry1] : 71 year old male with T2DM, hypothyroidism s/p KENT for toxic nodule, and hyperlipidemia. Glycemic control is acceptable.\par \par 1. T2DM with PVD and neuropathy - A1c 6.4%, c/o decreased sensation in B/L feet with worsening pain at night.\par -Continue Humulin R U500, Metformin, Januvia, and Jardiance.\par -Needs to see opthalmology for diabetic eye exam and podiatry.\par -Recommend diabetic shoes.\par -Start gabapentin 100 mg HS. Can increase dose if no relief of symptoms.\par -Repeat A1c in 3 months.\par \par 2. Hypothyroidism s/p KENT ablation - mild TSH elevation.\par -Reviewed correct administration of LT4- take in AM on empty stomach at least 30 minutes before eating or taking other medications.\par -Repeat TFTs in 6 weeks. If no improvement, will increase dose to 100 mcg daily.\par \par 3. Hyperlipidemia- LDL at target.\par -Continue statin.\par -Repeat lipids in 3 months.\par \par 4. Thyroid nodules\par -Repeat thyroid sonogram now.\par

## 2021-07-27 NOTE — HISTORY OF PRESENT ILLNESS
[FreeTextEntry1] : interval history: multiple myeloma, stage 1, seeing oncologist.\par \par Type: 2\par Severity: severe\par Duration: diagnosed 30 years ago\par \par Modifying factors: DM meds\par Humulin R U500 (pen) 80 units before BF and 85 before dinner\par Januvia 100 mg daily\par Jardiance 10 mg daily\par MFN 1000 mg BID\par \par Rare SMBG, about one per month. FBG is usually 140s. \par \par Associated symptoms\par Eye exam: over one year, history of focal neuropathy. Denies vision changes.\par Foot exam: May 2021, +neuropathy, reports worsening pain in B/L feel especially at night.\par Kidney disease: none\par Heart disease: CHF, MI. Has echo in two weeks.\par \par Diet: three meals daily, eats a lot of the same each day\par B- toast\par L- cheeseburger, chicken nuggets\par D- hot dogs\par S- ice cream\par Exercise: none\par Weight: stable\par -----------------------------------------------------------------------------------------\par Hyperlipidemia - on Atorvastatin 40 mg daily\par -------------------------------------------------------\par Hyperthyroid due to Left toxic nodule s/p KENT therapy 32 mCi 2010 with resultant hypothyroid. Also with small Right thyroid nodules.\par Current regimen: LT4 75 mcg, 1 tab Monday-Saturday, 2 tabs on Sunday\par Takes with all other medications 30-40 minutes before breakfast.\par Denies anterior neck pain, dysphagia, and voice changes.\par

## 2021-07-27 NOTE — REASON FOR VISIT
[Home] : at home, [unfilled] , at the time of the visit. [Medical Office: ([facility])___] : at the medical office located in  [Verbal consent obtained from patient] : the patient, [unfilled] [Follow - Up] : a follow-up visit [DM Type 2] : DM Type 2 [Hypothyroidism] : hypothyroidism [Thyroid nodule/ MNG] : thyroid nodule/ MNG

## 2021-07-27 NOTE — REVIEW OF SYSTEMS
[Pain/Numbness of Digits] : pain/numbness of digits [Recent Weight Gain (___ Lbs)] : no recent weight gain [Recent Weight Loss (___ Lbs)] : no recent weight loss [Blurred Vision] : no blurred vision [Chest Pain] : no chest pain [Palpitations] : no palpitations [Shortness Of Breath] : no shortness of breath [Nausea] : no nausea [Abdominal Pain] : no abdominal pain [Vomiting] : no vomiting [Polyuria] : no polyuria [Polydipsia] : no polydipsia

## 2021-08-10 ENCOUNTER — RX RENEWAL (OUTPATIENT)
Age: 71
End: 2021-08-10

## 2021-10-19 ENCOUNTER — APPOINTMENT (OUTPATIENT)
Dept: ENDOCRINOLOGY | Facility: CLINIC | Age: 71
End: 2021-10-19

## 2021-11-05 ENCOUNTER — RX RENEWAL (OUTPATIENT)
Age: 71
End: 2021-11-05

## 2021-11-23 LAB
HBA1C MFR BLD HPLC: 6.5
LDLC SERPL DIRECT ASSAY-MCNC: 69
MICROALBUMIN/CREAT 24H UR-RTO: 3

## 2021-11-24 ENCOUNTER — APPOINTMENT (OUTPATIENT)
Dept: ENDOCRINOLOGY | Facility: CLINIC | Age: 71
End: 2021-11-24
Payer: COMMERCIAL

## 2021-11-24 DIAGNOSIS — E11.40 TYPE 2 DIABETES MELLITUS WITH DIABETIC NEUROPATHY, UNSPECIFIED: ICD-10-CM

## 2021-11-24 DIAGNOSIS — I73.9 PERIPHERAL VASCULAR DISEASE, UNSPECIFIED: ICD-10-CM

## 2021-11-24 PROCEDURE — 99214 OFFICE O/P EST MOD 30 MIN: CPT | Mod: 95

## 2021-11-24 RX ORDER — CARVEDILOL 12.5 MG/1
12.5 TABLET, FILM COATED ORAL TWICE DAILY
Refills: 0 | Status: ACTIVE | COMMUNITY

## 2021-11-24 RX ORDER — BLOOD-GLUCOSE METER
W/DEVICE EACH MISCELLANEOUS
Qty: 1 | Refills: 0 | Status: ACTIVE | COMMUNITY
Start: 1900-01-01 | End: 1900-01-01

## 2021-11-24 NOTE — PHYSICAL EXAM
[Alert] : alert [No Acute Distress] : no acute distress [EOMI] : extra ocular movement intact [Oriented x3] : oriented to person, place, and time [Normal Affect] : the affect was normal [Normal Insight/Judgement] : insight and judgment were intact [Normal Mood] : the mood was normal [de-identified] : PVD skinchanges bilateral LE - bluish/red w ulcerations

## 2021-11-24 NOTE — ASSESSMENT
[FreeTextEntry1] : 71 year old male with T2DM, hypothyroidism s/p KENT for toxic nodule, and hyperlipidemia. Glycemic control is acceptable.\par \par 1. T2DM with PVD and neuropathy - A1c 6.5%, does not test FS, c/o decreased sensation in B/L feet with worsening pain at night.\par -Continue Humulin R U500, Metformin, Januvia, and Jardiance. tske januvia in the morning\par -Needs to see opthalmology for diabetic eye exam and podiatry.\par -incresae gabapentin 200 mg HS.\par -Repeat A1c in 3 months.\par - counseled pt on importance of testing FS spenser on U500 insulin which is a concentrated insulin and can cause dangerous hypoglycemia, will send Meter Rx to pharmacy\par - pt interested in dexcom, will reach out to CDE\par \par 2. Hypothyroidism s/p KENT ablation - mild TSH elevation.\par -Reviewed correct administration of LT4- take in AM on empty stomach at least 30 minutes before eating or taking other medications.\par -increase LT4 100 mcg daily, repeat TFTs 6 weeks\par \par 3. Hyperlipidemia- LDL at target.\par -Continue statin.\par -Repeat lipids in 3 months.\par \par 4. Thyroid nodules\par -Repeat thyroid sonogram now.\par \par 5. PVD skin changes wirh ulceration - advised vascular and wound care follow up\par

## 2021-11-24 NOTE — HISTORY OF PRESENT ILLNESS
[FreeTextEntry1] : interval history: \par multiple myeloma, stage 1, seeing oncologist and just monitoring for now\par not using dexcom bc he never received it\par did not have thyroid sono\par \par Type: 2\par Severity: severe\par Duration: diagnosed 30 years ago\par \par Modifying factors: DM meds\par Humulin R U500 (pen) 80 units before BF and 85 before dinner\par Januvia 100 mg at night\par Jardiance 10 mg daily\par MFN 1000 mg BID\par \par SMBG not testing\par \par Associated symptoms\par Foot exam: May 2021, +neuropathy, reports worsening pain in B/L feel especially at night, on gabepentin 100 mg nighlt\par Kidney disease: none\par Heart disease: CHF, MI. on jardiance\par \par Diet: three meals daily, eats a lot of the same each day\par Exercise: none\par -----------------------------------------------------------------------------------------\par Hyperlipidemia - on Atorvastatin 40 mg daily\par -------------------------------------------------------\par Hyperthyroid due to Left toxic nodule s/p KENT therapy 32 mCi 2010 with resultant hypothyroid. Also with small Right thyroid nodules.\par Current regimen: LT4 75 mcg, 1 tab Monday-Saturday, 2 tabs on Sunday\par Takes with all other medications 30-40 minutes before breakfast.\par Denies anterior neck pain, dysphagia, and voice changes.\par

## 2021-11-24 NOTE — DATA REVIEWED
[FreeTextEntry1] : Labs 11/16/21\par urine alb/Cr 3\par LDL 69, HDL 40, Tg 131\par Gluc 152, A1c 6.5\par TSH 6.77\par CBC normal

## 2021-11-24 NOTE — REASON FOR VISIT
[Follow - Up] : a follow-up visit [DM Type 2] : DM Type 2 [Hypothyroidism] : hypothyroidism [Thyroid nodule/ MNG] : thyroid nodule/ MNG [Home] : at home, [unfilled] , at the time of the visit. [Medical Office: ([facility])___] : at the medical office located in  [Verbal consent obtained from patient] : the patient, [unfilled]

## 2021-12-14 ENCOUNTER — RX RENEWAL (OUTPATIENT)
Age: 71
End: 2021-12-14

## 2022-05-04 ENCOUNTER — NON-APPOINTMENT (OUTPATIENT)
Age: 72
End: 2022-05-04

## 2022-05-30 ENCOUNTER — RX RENEWAL (OUTPATIENT)
Age: 72
End: 2022-05-30

## 2022-06-27 ENCOUNTER — RX RENEWAL (OUTPATIENT)
Age: 72
End: 2022-06-27

## 2022-06-28 ENCOUNTER — RX RENEWAL (OUTPATIENT)
Age: 72
End: 2022-06-28

## 2022-07-05 ENCOUNTER — RX RENEWAL (OUTPATIENT)
Age: 72
End: 2022-07-05

## 2022-07-06 ENCOUNTER — RX RENEWAL (OUTPATIENT)
Age: 72
End: 2022-07-06

## 2022-08-06 ENCOUNTER — APPOINTMENT (OUTPATIENT)
Dept: ENDOCRINOLOGY | Facility: CLINIC | Age: 72
End: 2022-08-06

## 2022-08-15 ENCOUNTER — RX RENEWAL (OUTPATIENT)
Age: 72
End: 2022-08-15

## 2022-08-25 ENCOUNTER — RX RENEWAL (OUTPATIENT)
Age: 72
End: 2022-08-25

## 2022-08-29 LAB
HBA1C MFR BLD HPLC: 6.6
LDLC SERPL DIRECT ASSAY-MCNC: 72
TSH SERPL-ACNC: 5.98

## 2022-08-30 ENCOUNTER — APPOINTMENT (OUTPATIENT)
Dept: ENDOCRINOLOGY | Facility: CLINIC | Age: 72
End: 2022-08-30

## 2022-08-30 VITALS
HEIGHT: 70 IN | BODY MASS INDEX: 45.1 KG/M2 | WEIGHT: 315 LBS | HEART RATE: 74 BPM | SYSTOLIC BLOOD PRESSURE: 118 MMHG | DIASTOLIC BLOOD PRESSURE: 70 MMHG

## 2022-08-30 LAB — GLUCOSE BLDC GLUCOMTR-MCNC: 122

## 2022-08-30 PROCEDURE — 82962 GLUCOSE BLOOD TEST: CPT

## 2022-08-30 PROCEDURE — 99214 OFFICE O/P EST MOD 30 MIN: CPT | Mod: 25

## 2022-08-30 RX ORDER — BLOOD-GLUCOSE TRANSMITTER
EACH MISCELLANEOUS
Qty: 1 | Refills: 1 | Status: DISCONTINUED | COMMUNITY
Start: 2021-04-27 | End: 2022-08-30

## 2022-08-30 RX ORDER — BLOOD-GLUCOSE,RECEIVER,CONT
EACH MISCELLANEOUS
Qty: 1 | Refills: 0 | Status: DISCONTINUED | COMMUNITY
Start: 2021-04-27 | End: 2022-08-30

## 2022-08-30 RX ORDER — BLOOD-GLUCOSE SENSOR
EACH MISCELLANEOUS
Qty: 3 | Refills: 1 | Status: DISCONTINUED | COMMUNITY
Start: 2021-04-27 | End: 2022-08-30

## 2022-09-02 ENCOUNTER — RX RENEWAL (OUTPATIENT)
Age: 72
End: 2022-09-02

## 2022-09-23 NOTE — REVIEW OF SYSTEMS
[Nocturia] : nocturia [Fatigue] : no fatigue [Recent Weight Gain (___ Lbs)] : no recent weight gain [Recent Weight Loss (___ Lbs)] : no recent weight loss [Blurred Vision] : no blurred vision [Chest Pain] : no chest pain [Shortness Of Breath] : no shortness of breath [Nausea] : no nausea [Abdominal Pain] : no abdominal pain [Polyuria] : no polyuria [Pain/Numbness of Digits] : no pain/numbness of digits [Polydipsia] : no polydipsia

## 2022-09-23 NOTE — ASSESSMENT
[FreeTextEntry1] : 72 yr old male with:\par \par 1. T2DM with PVD and neuropathy - A1c ok, testing on most days with good control, denies lows\par -Continue Humulin R U500, Metformin, Januvia, and Jardiance.\par -Needs to see opthalmology for diabetic eye exam and podiatry.\par -gabapentin not working despite increased dose, consider Lyrica\par -Repeat A1c needed\par - cont testing FS spenser on U500 insulin which is a concentrated insulin and can cause dangerous hypoglycemia\par - pt interested in dexcom, will reach out to CDE\par \par 2. Hypothyroidism s/p KENT ablation - mild TSH elevation.\par  cont  LT4 100 mcg daily, repeat TFTs needed\par \par 3. Hyperlipidemia- LDL at target.\par -Continue statin.\par \par 4. Thyroid nodules\par -Repeat thyroid sonogram\par \par 5. PVD skin changes bilateral LE- advised vascular follow up\par

## 2022-09-23 NOTE — DATA REVIEWED
[FreeTextEntry1] : Labs 11/16/21\par urine alb/Cr 3\par LDL 69, HDL 40, Tg 131\par Gluc 152, A1c 6.5\par TSH 6.77\par CBC normal\par \par labs 3/21/22\par LDL 72, HDL 36, Tg 143\par A1c 6.6\par TSh 5.98\par Cr 1.11 GFR 66

## 2022-09-23 NOTE — PHYSICAL EXAM
[Alert] : alert [Obese] : obese [EOMI] : extra ocular movement intact [No Accessory Muscle Use] : no accessory muscle use [Clear to Auscultation] : lungs were clear to auscultation bilaterally [Normal S1, S2] : normal S1 and S2 [Normal Rate] : heart rate was normal [Oriented x3] : oriented to person, place, and time [Normal Affect] : the affect was normal [Normal Insight/Judgement] : insight and judgment were intact [Normal Mood] : the mood was normal [de-identified] : PVD sckin changes bilateral LE [de-identified] : declined foot exam today

## 2022-09-23 NOTE — ADDENDUM
[FreeTextEntry1] : Glucose Sensor Necessity:  This patient with diabetes (dx: E11.65)  The patient is treated with humulin U-500 insulin via 2 or more injections daily  This patient requires frequent adjustments to their insulin treatment on the basis of therapeutic continuous glucose monitoring results. (or This patient is adjusting their blood glucose based on data from the continuous glucose monitor.) In addition, the patient has been to our office for an evaluation of their diabetes control within the past 6 months.\par

## 2022-09-23 NOTE — HISTORY OF PRESENT ILLNESS
[FreeTextEntry1] : interval history: \par last seen 111/2021\par multiple myeloma, stage 1, seeing oncologist and just monitoring for now\par not using CGM\par no issues, no changes\par \par Type: 2\par Severity: severe\par Duration: diagnosed 30 years ago\par \par Modifying factors: DM meds\par Humulin R U500 (pen) 80 units before BF and 85 before dinner\par Januvia 100 mg at night\par Jardiance 10 mg daily\par MFN 1000 mg BID\par \par SMBG : meter reviewed, testing 2-3x daily. not testing every day,  FS 's, lowest 84\par \par \par Associated symptoms\par Foot exam: May 2021, +neuropathy, reports worsening pain in B/L feel especially at night, on gabepentin 200-300 mg nightly\par Kidney disease: none\par Heart disease: CHF, MI. on jardiance\par \par Diet: three meals daily, eats a lot of the same each day\par Exercise: none\par -----------------------------------------------------------------------------------------\par Hyperlipidemia - on Atorvastatin 40 mg daily\par -------------------------------------------------------\par Hyperthyroid due to Left toxic nodule s/p KENT therapy 32 mCi 2010 with resultant hypothyroid. Also with small Right thyroid nodules.\par Current regimen: LT4 75 mcg, 1 tab Monday-Saturday, 2 tabs on Sunday\par Takes with all other medications 30-40 minutes before breakfast.\par Denies anterior neck pain, dysphagia, and voice changes.\par

## 2022-10-07 ENCOUNTER — RX RENEWAL (OUTPATIENT)
Age: 72
End: 2022-10-07

## 2022-10-25 ENCOUNTER — RX RENEWAL (OUTPATIENT)
Age: 72
End: 2022-10-25

## 2022-10-25 ENCOUNTER — NON-APPOINTMENT (OUTPATIENT)
Age: 72
End: 2022-10-25

## 2022-10-25 RX ORDER — GABAPENTIN 100 MG/1
100 CAPSULE ORAL
Qty: 180 | Refills: 0 | Status: DISCONTINUED | COMMUNITY
Start: 2021-07-27 | End: 2022-10-25

## 2022-11-16 ENCOUNTER — RX RENEWAL (OUTPATIENT)
Age: 72
End: 2022-11-16

## 2022-12-22 LAB
HBA1C MFR BLD HPLC: 6.2
LDLC SERPL DIRECT ASSAY-MCNC: 72
TSH SERPL-ACNC: 4.31

## 2022-12-27 ENCOUNTER — APPOINTMENT (OUTPATIENT)
Dept: ENDOCRINOLOGY | Facility: CLINIC | Age: 72
End: 2022-12-27

## 2022-12-29 ENCOUNTER — RX RENEWAL (OUTPATIENT)
Age: 72
End: 2022-12-29

## 2023-01-19 RX ORDER — ATORVASTATIN CALCIUM 40 MG/1
40 TABLET, FILM COATED ORAL DAILY
Qty: 90 | Refills: 1 | Status: ACTIVE | COMMUNITY
Start: 1900-01-01 | End: 1900-01-01

## 2023-02-10 ENCOUNTER — RX RENEWAL (OUTPATIENT)
Age: 73
End: 2023-02-10

## 2023-04-04 ENCOUNTER — APPOINTMENT (OUTPATIENT)
Dept: ENDOCRINOLOGY | Facility: CLINIC | Age: 73
End: 2023-04-04
Payer: COMMERCIAL

## 2023-04-04 VITALS
HEART RATE: 74 BPM | BODY MASS INDEX: 45.1 KG/M2 | DIASTOLIC BLOOD PRESSURE: 62 MMHG | SYSTOLIC BLOOD PRESSURE: 120 MMHG | OXYGEN SATURATION: 98 % | WEIGHT: 315 LBS | HEIGHT: 70 IN

## 2023-04-04 LAB — GLUCOSE BLDC GLUCOMTR-MCNC: 202

## 2023-04-04 PROCEDURE — 82962 GLUCOSE BLOOD TEST: CPT

## 2023-04-04 PROCEDURE — 99214 OFFICE O/P EST MOD 30 MIN: CPT | Mod: 25

## 2023-04-04 RX ORDER — SPIRONOLACTONE 25 MG/1
25 TABLET ORAL
Qty: 90 | Refills: 2 | Status: ACTIVE | COMMUNITY

## 2023-04-04 RX ORDER — SITAGLIPTIN 100 MG/1
100 TABLET, FILM COATED ORAL DAILY
Qty: 90 | Refills: 1 | Status: DISCONTINUED | COMMUNITY
Start: 2022-11-16 | End: 2023-04-04

## 2023-04-04 NOTE — DATA REVIEWED
[FreeTextEntry1] : \par \par Labs 11/16/21\par urine alb/Cr 3\par LDL 69, HDL 40, Tg 131\par Gluc 152, A1c 6.5\par TSH 6.77\par CBC normal\par \par labs 3/21/22\par LDL 72, HDL 36, Tg 143\par A1c 6.6\par TSh 5.98\par Cr 1.11 GFR 66\par \par Labs 2/15/23\par LDL 70\par Gluc 160, A1c 6.8\par Cr 1.18, GFR 65

## 2023-04-04 NOTE — ASSESSMENT
[FreeTextEntry1] : 72 yr old male with:\par \par 1. T2DM with PVD and neuropathy - A1c ok, not testing enough\par - risks of U500 and hypoglycemia disussed and he needs to be testing sugat minimum of 3x daily, spenser at bedtime, dangerous to inject insulin without knowing blood sugar\par - interested in Gildardo 3, will reach out [facility] (insurance will not cover dexcom)\par -Continue Humulin R U500, Metformin and Jardiance.\par - stop Januvia, try Ozempic 0.25 mgweekly to help w weight loss\par -Needs to see opthalmology for diabetic eye exam and podiatry.\par - cont lyrica, not much improvement, he will try taking 2 tabs for 1 week\par -Repeat A1c 3 months\par \par 2. Hypothyroidism s/p KENT ablation - no recent TSH\par  cont  LT4 100 mcg daily, repeat TFTs needed\par \par 3. Hyperlipidemia- LDL at target.\par -Continue statin.\par \par 4.h/o Thyroid nodule\par -Repeat thyroid sonogram\par \par 5. Mprbid obesity - start ozempic. risks/benefits discussed\par

## 2023-04-04 NOTE — HISTORY OF PRESENT ILLNESS
[FreeTextEntry1] : interval history: \par last seen 8/2022\par Left eye blurry vision, no recent eye exam, has upcoming apt\par \par Type: 2\par Severity: severe\par Duration: diagnosed 30 years ago\par \par Modifying factors: DM meds\par Humulin R U500 (pen) 80 units before BF and 85 before dinner\par Januvia 100 mg at night\par Jardiance 10 mg daily\par MFN 1000 mg BID\par \par SMBG : not testing daily, insurance does not cover dexcom, has shannan at home but not using it, denies hypoglycemia\par \par Associated symptoms\par (+) neuropathy, reports worsening pain in B/L feel especially at night, on pregabalin\par Heart disease: CHF, MI. on jardiance\par \par Diet: three meals daily, eats a lot of the same each day\par Exercise: none\par -----------------------------------------------------------------------------------------\par Hyperlipidemia - on Atorvastatin 40 mg daily\par -------------------------------------------------------\par Hyperthyroid due to Left toxic nodule s/p KENT therapy 32 mCi 2010 with resultant hypothyroid. Also with small Right thyroid nodules.\par Current regimen: LT4 100 mcg 1 tab daily\par Takes with all other medications 30-40 minutes before breakfast.\par Denies anterior neck pain, dysphagia, and voice changes.\par

## 2023-04-04 NOTE — PHYSICAL EXAM
[Alert] : alert [Obese] : obese [EOMI] : extra ocular movement intact [No Accessory Muscle Use] : no accessory muscle use [Clear to Auscultation] : lungs were clear to auscultation bilaterally [Normal S1, S2] : normal S1 and S2 [Normal Rate] : heart rate was normal [Oriented x3] : oriented to person, place, and time [Normal Affect] : the affect was normal [Normal Insight/Judgement] : insight and judgment were intact [Normal Mood] : the mood was normal [de-identified] : PVD sckin changes bilateral LE [de-identified] : declined foot exam today

## 2023-04-05 ENCOUNTER — RX RENEWAL (OUTPATIENT)
Age: 73
End: 2023-04-05

## 2023-04-24 ENCOUNTER — RX RENEWAL (OUTPATIENT)
Age: 73
End: 2023-04-24

## 2023-07-10 ENCOUNTER — RX RENEWAL (OUTPATIENT)
Age: 73
End: 2023-07-10

## 2023-08-07 LAB
HBA1C MFR BLD HPLC: 6.8
LDLC SERPL DIRECT ASSAY-MCNC: 70

## 2023-08-08 ENCOUNTER — APPOINTMENT (OUTPATIENT)
Dept: ENDOCRINOLOGY | Facility: CLINIC | Age: 73
End: 2023-08-08
Payer: COMMERCIAL

## 2023-08-08 ENCOUNTER — RX RENEWAL (OUTPATIENT)
Age: 73
End: 2023-08-08

## 2023-08-08 VITALS
HEART RATE: 77 BPM | DIASTOLIC BLOOD PRESSURE: 66 MMHG | BODY MASS INDEX: 45.1 KG/M2 | OXYGEN SATURATION: 95 % | SYSTOLIC BLOOD PRESSURE: 122 MMHG | HEIGHT: 70 IN | WEIGHT: 315 LBS

## 2023-08-08 DIAGNOSIS — E66.01 MORBID (SEVERE) OBESITY DUE TO EXCESS CALORIES: ICD-10-CM

## 2023-08-08 LAB — GLUCOSE BLDC GLUCOMTR-MCNC: 157

## 2023-08-08 PROCEDURE — 82962 GLUCOSE BLOOD TEST: CPT | Mod: NC

## 2023-08-08 PROCEDURE — 99214 OFFICE O/P EST MOD 30 MIN: CPT | Mod: 25

## 2023-08-08 RX ORDER — TORSEMIDE 10 MG/1
10 TABLET ORAL DAILY
Refills: 0 | Status: DISCONTINUED | COMMUNITY
End: 2023-08-08

## 2023-08-08 RX ORDER — TAMOXIFEN CITRATE 20 MG/1
20 TABLET, FILM COATED ORAL AT BEDTIME
Refills: 0 | Status: DISCONTINUED | COMMUNITY
End: 2023-08-08

## 2023-08-08 NOTE — DATA REVIEWED
[FreeTextEntry1] :  Labs 11/16/21 urine alb/Cr 3 LDL 69, HDL 40, Tg 131 Gluc 152, A1c 6.5 TSH 6.77 CBC normal  labs 3/21/22 LDL 72, HDL 36, Tg 143 A1c 6.6 TSh 5.98 Cr 1.11 GFR 66  Labs 2/15/23 LDL 70 Gluc 160, A1c 6.8 Cr 1.18, GFR 65

## 2023-08-08 NOTE — PHYSICAL EXAM
[Alert] : alert [Obese] : obese [EOMI] : extra ocular movement intact [No Accessory Muscle Use] : no accessory muscle use [Clear to Auscultation] : lungs were clear to auscultation bilaterally [Normal S1, S2] : normal S1 and S2 [Normal Rate] : heart rate was normal [Oriented x3] : oriented to person, place, and time [Normal Affect] : the affect was normal [Normal Insight/Judgement] : insight and judgment were intact [Normal Mood] : the mood was normal [de-identified] : PVD skin changes bilateral LE [de-identified] : declined foot exam today

## 2023-08-08 NOTE — ASSESSMENT
[FreeTextEntry1] : 73 yr old male with:  1. T2DM with PVD and neuropathy - FS ok on glucometer but nor testing enough, no recent A1c - updated labs needed - risks of U500 and hypoglycemia disussed and he needs to be testing sugat minimum of 3x daily, spenser at bedtime, dangerous to inject insulin without knowing blood sugar - rx sent for  Gildardo 3 CGM -Continue Humulin R U500, Metformin and Jardiance. - incresae ozempic to 0.5 mg weekly for 4 weeks and then 1 mg weekly (for weight loss benefit) -Needs to see opthalmology for diabetic eye exam and podiatry. - repeat labs needed  2. Hypothyroidism s/p KENT ablation - no recent TSH  cont  LT4 100 mcg daily, repeat TFTs needed  3. Hyperlipidemia- -Continue statin.  4.h/o Thyroid nodule -Repeat thyroid sonogram  5. Morbid obesity - increase ozempic dosing. risks/benefits discussed  Glucose Sensor Necessity:  This patient with diabetes (dx: E11.65)  The patient is treated with humulin U-500 insulin via 2 or more injections daily  This patient requires frequent adjustments to their insulin treatment on the basis of therapeutic continuous glucose monitoring results. (or This patient is adjusting their blood glucose based on data from the continuous glucose monitor.) In addition, the patient has been to our office for an evaluation of their diabetes control within the past 6 months.

## 2023-08-08 NOTE — HISTORY OF PRESENT ILLNESS
[FreeTextEntry1] : interval history:  did not have labs/sono done did not see eye doctot  Type: 2 Severity: severe Duration: diagnosed 30 years ago  Modifying factors: DM meds Humulin R U500 (pen) 80 units before BF and 85 before dinner Jardiance 10 mg daily MFN 1000 mg BID Ozempic 0.25 mg weekly  SMBG : not testing regularly, meter reviewed: 120-180, denies lows, occ 200's due to diet.  insurance does not cover dexcom, has shannan at home but not using it  Associated symptoms (+) neuropathy, reports worsening pain in B/L feel especially at night, on pregabalin Heart disease: CHF, MI. on jardiance  Diet: three meals daily, eats a lot of the same each day Exercise: none ----------------------------------------------------------------------------------------- Hyperlipidemia - on Atorvastatin 40 mg daily ------------------------------------------------------- Hyperthyroid due to Left toxic nodule s/p KENT therapy 32 mCi 2010 with resultant hypothyroid. Also with small Right thyroid nodules. Current regimen: LT4 100 mcg 1 tab daily Takes with all other medications 30-40 minutes before breakfast. Denies anterior neck pain, dysphagia, and voice changes.

## 2023-09-25 ENCOUNTER — RX RENEWAL (OUTPATIENT)
Age: 73
End: 2023-09-25

## 2023-09-28 ENCOUNTER — RX RENEWAL (OUTPATIENT)
Age: 73
End: 2023-09-28

## 2023-10-05 ENCOUNTER — RX RENEWAL (OUTPATIENT)
Age: 73
End: 2023-10-05

## 2023-10-31 ENCOUNTER — RX RENEWAL (OUTPATIENT)
Age: 73
End: 2023-10-31

## 2023-10-31 RX ORDER — BLOOD SUGAR DIAGNOSTIC
STRIP MISCELLANEOUS 3 TIMES DAILY
Qty: 300 | Refills: 3 | Status: ACTIVE | COMMUNITY
Start: 2021-05-27 | End: 1900-01-01

## 2023-11-10 NOTE — H&P ADULT - HISTORY OF PRESENT ILLNESS
69 morbidly obese male w/ PMHx DM, hypothyroid, CAD, ICM/ HFrEF s/p ICD, PVD, remote prostate CA, Colon CA s/p resection, L breast CA s/p mastectomy and chemo ( 2yrs back) and recently diagnosed w/ MM ( f/u at [facility] [place]) presented to the ED with fatigue. He supposedly could not get out of his car 69 morbidly obese male w/ PMHx DM, hypothyroid, CAD, ICM/ HFrEF s/p ICD, PVD, remote prostate CA, Colon CA s/p resection, L breast CA s/p mastectomy and chemo ( 2yrs back) and recently diagnosed w/ MM ( f/u at [facility] [place]) presented to the ED with fatigue. He supposedly could not get out of his car b/c of fatigue. Denies any chest pain, abd pain, N/V/D/C/fever/chills/dysuria or cough. H/o recurrent falls, last one about a week back when he had a head wound. Also endorses chronic back pain  In ED today he was found to be in shock w/ lactate 5.3. Received a bolus ~ 3.5L and was started on Levo gtt.  Did not take any of his meds today 69 morbidly obese male w/ PMHx DM, hypothyroid, CAD, ICM/ HFrEF s/p ICD, PVD, remote prostate CA, Colon CA s/p resection, L breast CA s/p mastectomy and chemo ( 2yrs back) and recently diagnosed w/ MM ( f/u at [facility] [place]) presented to the ED with fatigue. He supposedly could not get out of his car b/c of fatigue. Denies any chest pain, abd pain, N/V/D/C/fever/chills/dysuria or cough. H/o recurrent falls, last one about a week back when he had a head wound. Also endorses chronic back pain. In ED today he was found to be in shock, BP   74/47mmHG w/ T max 102.7 and lactate 5.3. Received a bolus ~ 3.5L and was started on Levo gtt. On [facility] eval pt was awake, sitting up in bed and did not have any new complaints  Did not take any of his meds today  EKG Paced rhythm  CXR 69 morbidly obese male w/ PMHx DM, hypothyroid, CAD, ICM/ HFrEF s/p ICD, PVD, remote prostate CA, Colon CA s/p resection, L breast CA s/p mastectomy (on Tamoxifen) and recently diagnosed w/ MM ( [place] [facility] [place] to the ED with fatigue. He supposedly could not get out of his car b/c of fatigue. Denies any chest pain, abd pain, N/V/D/C/fever/chills/dysuria or cough. H/o recurrent falls, last one about a week back when he had a head wound. Also endorses chronic back pain. In ED today he was found to be in shock, BP   74/47mmHG w/ T max 102.7 and lactate 5.3. Received a bolus ~ 3.5L and was started on Levo gtt. On [facility] eval pt was awake, sitting up in bed and did not have any new complaints  Did not take any of his meds today  EKG Paced rhythm  CXR 69 morbidly obese male w/ PMHx DM, hypothyroid, CAD, ICM/ HFrEF s/p BiVICD, PVD, remote prostate CA, Colon CA s/p resection, L breast CA s/p mastectomy (on Tamoxifen) and recently diagnosed w/ MM ( f/u at [facility] [place]) presented to the ED with fatigue. He supposedly could not get out of his car b/c of fatigue. Denies any chest pain, abd pain, N/V/D/C/fever/chills/dysuria or cough. H/o recurrent falls, last one about a week back when he had a head wound. Also endorses chronic back pain. In ED today he was found to be in shock, BP   74/47mmHG w/ T max 102.7 and lactate 5.3. Received a bolus ~ 3.5L and was started on Levo gtt. On [facility] eval pt was awake, sitting up in bed and did not have any new complaints  Did not take any of his meds today  EKG Paced rhythm. CXR appears mildly congested Patient/Caregiver provided printed discharge information.

## 2023-11-14 LAB
HBA1C MFR BLD HPLC: 6.6
LDLC SERPL DIRECT ASSAY-MCNC: 71
MICROALBUMIN/CREAT 24H UR-RTO: 18
TSH SERPL-ACNC: 4.91

## 2023-11-15 ENCOUNTER — APPOINTMENT (OUTPATIENT)
Dept: ENDOCRINOLOGY | Facility: CLINIC | Age: 73
End: 2023-11-15
Payer: COMMERCIAL

## 2023-11-15 VITALS
HEART RATE: 81 BPM | WEIGHT: 315 LBS | HEIGHT: 70 IN | BODY MASS INDEX: 45.1 KG/M2 | SYSTOLIC BLOOD PRESSURE: 116 MMHG | DIASTOLIC BLOOD PRESSURE: 68 MMHG

## 2023-11-15 DIAGNOSIS — Z79.4 LONG TERM (CURRENT) USE OF INSULIN: ICD-10-CM

## 2023-11-15 LAB — GLUCOSE BLDC GLUCOMTR-MCNC: 122

## 2023-11-15 PROCEDURE — 82962 GLUCOSE BLOOD TEST: CPT

## 2023-11-15 PROCEDURE — 99214 OFFICE O/P EST MOD 30 MIN: CPT | Mod: 25

## 2023-11-15 PROCEDURE — 95251 CONT GLUC MNTR ANALYSIS I&R: CPT

## 2024-02-12 RX ORDER — BLOOD-GLUCOSE SENSOR
EACH MISCELLANEOUS
Qty: 84 | Refills: 3 | Status: ACTIVE | COMMUNITY
Start: 2023-08-08 | End: 1900-01-01

## 2024-02-20 RX ORDER — METFORMIN HYDROCHLORIDE 1000 MG/1
1000 TABLET, COATED ORAL
Qty: 180 | Refills: 0 | Status: ACTIVE | COMMUNITY
Start: 2021-08-10 | End: 1900-01-01

## 2024-04-03 ENCOUNTER — RX RENEWAL (OUTPATIENT)
Age: 74
End: 2024-04-03

## 2024-04-04 ENCOUNTER — RX RENEWAL (OUTPATIENT)
Age: 74
End: 2024-04-04

## 2024-04-04 RX ORDER — PREGABALIN 75 MG/1
75 CAPSULE ORAL
Qty: 180 | Refills: 1 | Status: ACTIVE | COMMUNITY
Start: 2022-10-25 | End: 1900-01-01

## 2024-04-16 RX ORDER — LEVOTHYROXINE SODIUM 0.11 MG/1
112 TABLET ORAL DAILY
Qty: 90 | Refills: 1 | Status: ACTIVE | COMMUNITY
Start: 2020-08-27 | End: 1900-01-01

## 2024-04-17 ENCOUNTER — RX RENEWAL (OUTPATIENT)
Age: 74
End: 2024-04-17

## 2024-04-17 RX ORDER — PEN NEEDLE, DIABETIC 29 G X1/2"
31G X 5 MM NEEDLE, DISPOSABLE MISCELLANEOUS
Qty: 180 | Refills: 3 | Status: ACTIVE | COMMUNITY
Start: 1900-01-01 | End: 1900-01-01

## 2024-04-19 RX ORDER — EMPAGLIFLOZIN 25 MG/1
25 TABLET, FILM COATED ORAL
Qty: 90 | Refills: 1 | Status: ACTIVE | COMMUNITY
Start: 2020-08-27 | End: 1900-01-01

## 2024-04-30 LAB
HBA1C MFR BLD HPLC: 6.3
LDLC SERPL DIRECT ASSAY-MCNC: 76
MICROALBUMIN/CREAT 24H UR-RTO: 34
TSH SERPL-ACNC: 1.9

## 2024-05-01 ENCOUNTER — APPOINTMENT (OUTPATIENT)
Dept: ENDOCRINOLOGY | Facility: CLINIC | Age: 74
End: 2024-05-01
Payer: MEDICARE

## 2024-05-01 VITALS
HEART RATE: 84 BPM | SYSTOLIC BLOOD PRESSURE: 100 MMHG | HEIGHT: 70 IN | BODY MASS INDEX: 45.1 KG/M2 | OXYGEN SATURATION: 92 % | DIASTOLIC BLOOD PRESSURE: 64 MMHG | WEIGHT: 315 LBS

## 2024-05-01 DIAGNOSIS — E11.65 TYPE 2 DIABETES MELLITUS WITH HYPERGLYCEMIA: ICD-10-CM

## 2024-05-01 DIAGNOSIS — E89.0 POSTPROCEDURAL HYPOTHYROIDISM: ICD-10-CM

## 2024-05-01 DIAGNOSIS — E78.5 HYPERLIPIDEMIA, UNSPECIFIED: ICD-10-CM

## 2024-05-01 DIAGNOSIS — E04.2 NONTOXIC MULTINODULAR GOITER: ICD-10-CM

## 2024-05-01 DIAGNOSIS — E66.01 MORBID (SEVERE) OBESITY DUE TO EXCESS CALORIES: ICD-10-CM

## 2024-05-01 LAB — GLUCOSE BLDC GLUCOMTR-MCNC: 124

## 2024-05-01 PROCEDURE — 82962 GLUCOSE BLOOD TEST: CPT

## 2024-05-01 PROCEDURE — 95251 CONT GLUC MNTR ANALYSIS I&R: CPT

## 2024-05-01 PROCEDURE — 99214 OFFICE O/P EST MOD 30 MIN: CPT

## 2024-05-01 NOTE — DATA REVIEWED
[FreeTextEntry1] : Labs 11/16/21 urine alb/Cr 3 LDL 69, HDL 40, Tg 131 Gluc 152, A1c 6.5 TSH 6.77 CBC normal  labs 3/21/22 LDL 72, HDL 36, Tg 143 A1c 6.6 TSh 5.98 Cr 1.11 GFR 66  Labs 2/15/23 LDL 70 Gluc 160, A1c 6.8 Cr 1.18, GFR 65  Labs 10/16/23 urine alb/cr 18 LDL 71, HDL 37, Trig 145 Gluc 144, A1c 6.6 Cr 0.95, GFR 85 TSH 4.91, FT4 1.1  Labs 3/14/24 urine alb/cr 34 Trig 151, HDL 37, LDL 76 Gluc 128, A1c 6.3 Cr 0.90, GFR 90 TSH 1.90, FT4 1.0 B12 707

## 2024-05-01 NOTE — HISTORY OF PRESENT ILLNESS
[FreeTextEntry1] : interval history: reports morning hypoglycemia with frequent alarms  Type: 2 Severity: severe Duration: diagnosed 30 years ago  Modifying factors: DM meds Humulin R U500 (pen) 75 units before BF and 80 before dinner Jardiance 25 mg daily MFN 1000 mg BID Ozempic 2 mg weekly  SMBG : using Gildardo CGM high 8%, TIR 91%, low 1% - excellent control  Associated symptoms 2023 neg urine alb/cr, 2024 urine alb/cr 34 eye exam 10/2023 no DR (+) neuropathy, reports worsening pain in B/L feel especially at night, on pregabalin Heart disease: CHF, MI. on jardiance  Diet; 2 meals daily, eats a lot of the same each day Exercise: none ----------------------------------------------------------------------------------------- Hyperlipidemia - on Atorvastatin 40 mg daily ------------------------------------------------------- Hyperthyroid due to Left toxic nodule s/p KENT therapy 32 mCi 2010 with resultant hypothyroid. Also with small Right thyroid nodules. Current regimen: LT4 112 mcg 1 tab daily Takes with all other medications 30-40 minutes before breakfast. Denies anterior neck pain, dysphagia, and voice changes.

## 2024-05-01 NOTE — PHYSICAL EXAM
[Alert] : alert [Obese] : obese [EOMI] : extra ocular movement intact [No Accessory Muscle Use] : no accessory muscle use [Clear to Auscultation] : lungs were clear to auscultation bilaterally [Normal S1, S2] : normal S1 and S2 [Normal Rate] : heart rate was normal [Oriented x3] : oriented to person, place, and time [Normal Affect] : the affect was normal [Normal Insight/Judgement] : insight and judgment were intact [Normal Mood] : the mood was normal [de-identified] : PVD skin changes bilateral LE [de-identified] : declined foot exam today

## 2024-05-01 NOTE — ASSESSMENT
[FreeTextEntry1] : 73 yr old male with:  1. T2DM with PVD and neuropathy - overall good control on gildardo but fasting hypoglycemia and lower am numbers - risks of U500 and hypoglycemia disussed -cont Gildardo 3 -Continue Metformin 1000 mg BID, monitor B12 level on MFN -cont ozempic 2 mg weekly -cont Jardiance to 25 mg daily - decrease U500 75 in am, 70 in pm - may need to decrease to 60 units if morning numbers still low - repeat labs in 3 months  2. Hypothyroidism s/p KENT ablation - euthyroid - cont LT4 112 mcg daily, repeat TFTs 1 week before next visit  3. Hyperlipidemia- -Continue statin.  4.h/o Thyroid nodule -Repeat thyroid sonogram  5. Morbid obesity - losing weight cont ozempic and jardiance.  discussed bariatric surgery, he will think about consultation  Glucose Sensor Necessity: This patient with diabetes (dx: E11.65) The patient is treated with humulin U-500 insulin via 2 or more injections daily This patient requires frequent adjustments to their insulin treatment on the basis of therapeutic continuous glucose monitoring results. (or This patient is adjusting their blood glucose based on data from the continuous glucose monitor.) In addition, the patient has been to our office for an evaluation of their diabetes control within the past 6 months.

## 2024-05-01 NOTE — REVIEW OF SYSTEMS
[Nocturia] : nocturia [Recent Weight Loss (___ Lbs)] : recent weight loss: [unfilled] lbs [Fatigue] : no fatigue [Blurred Vision] : no blurred vision [Chest Pain] : no chest pain [Shortness Of Breath] : no shortness of breath [Nausea] : no nausea [Abdominal Pain] : no abdominal pain [Polyuria] : no polyuria [Pain/Numbness of Digits] : no pain/numbness of digits [Polydipsia] : no polydipsia

## 2024-06-11 ENCOUNTER — RX RENEWAL (OUTPATIENT)
Age: 74
End: 2024-06-11

## 2024-06-11 RX ORDER — SEMAGLUTIDE 2.68 MG/ML
8 INJECTION, SOLUTION SUBCUTANEOUS
Qty: 3 | Refills: 1 | Status: ACTIVE | COMMUNITY
Start: 2023-04-04 | End: 1900-01-01

## 2024-06-19 ENCOUNTER — RX RENEWAL (OUTPATIENT)
Age: 74
End: 2024-06-19

## 2024-06-19 RX ORDER — INSULIN HUMAN 500 [IU]/ML
500 INJECTION, SOLUTION SUBCUTANEOUS
Qty: 30 | Refills: 3 | Status: ACTIVE | COMMUNITY
Start: 2018-08-28 | End: 1900-01-01

## 2024-07-27 ENCOUNTER — RX RENEWAL (OUTPATIENT)
Age: 74
End: 2024-07-27

## 2024-07-31 ENCOUNTER — RX RENEWAL (OUTPATIENT)
Age: 74
End: 2024-07-31

## 2024-08-06 ENCOUNTER — RX RENEWAL (OUTPATIENT)
Age: 74
End: 2024-08-06

## 2024-08-27 ENCOUNTER — APPOINTMENT (OUTPATIENT)
Dept: ENDOCRINOLOGY | Facility: CLINIC | Age: 74
End: 2024-08-27

## 2024-08-29 ENCOUNTER — RX RENEWAL (OUTPATIENT)
Age: 74
End: 2024-08-29

## 2024-10-02 ENCOUNTER — RX RENEWAL (OUTPATIENT)
Age: 74
End: 2024-10-02

## 2024-10-10 ENCOUNTER — APPOINTMENT (OUTPATIENT)
Dept: ENDOCRINOLOGY | Facility: CLINIC | Age: 74
End: 2024-10-10
Payer: MEDICARE

## 2024-10-10 VITALS — SYSTOLIC BLOOD PRESSURE: 102 MMHG | DIASTOLIC BLOOD PRESSURE: 60 MMHG

## 2024-10-10 VITALS — WEIGHT: 315 LBS | BODY MASS INDEX: 45.1 KG/M2 | HEIGHT: 70 IN

## 2024-10-10 VITALS — HEART RATE: 76 BPM | OXYGEN SATURATION: 97 %

## 2024-10-10 DIAGNOSIS — E11.65 TYPE 2 DIABETES MELLITUS WITH HYPERGLYCEMIA: ICD-10-CM

## 2024-10-10 DIAGNOSIS — E11.40 TYPE 2 DIABETES MELLITUS WITH DIABETIC NEUROPATHY, UNSPECIFIED: ICD-10-CM

## 2024-10-10 DIAGNOSIS — E78.5 HYPERLIPIDEMIA, UNSPECIFIED: ICD-10-CM

## 2024-10-10 DIAGNOSIS — E04.2 NONTOXIC MULTINODULAR GOITER: ICD-10-CM

## 2024-10-10 LAB
HBA1C MFR BLD HPLC: 6.3
LDLC SERPL DIRECT ASSAY-MCNC: 58

## 2024-10-10 PROCEDURE — 95251 CONT GLUC MNTR ANALYSIS I&R: CPT

## 2024-10-10 PROCEDURE — 99214 OFFICE O/P EST MOD 30 MIN: CPT

## 2024-10-10 PROCEDURE — G2211 COMPLEX E/M VISIT ADD ON: CPT

## 2024-10-16 ENCOUNTER — RX RENEWAL (OUTPATIENT)
Age: 74
End: 2024-10-16

## 2024-10-23 ENCOUNTER — RX RENEWAL (OUTPATIENT)
Age: 74
End: 2024-10-23

## 2024-11-25 ENCOUNTER — RX RENEWAL (OUTPATIENT)
Age: 74
End: 2024-11-25

## 2025-02-07 ENCOUNTER — RX RENEWAL (OUTPATIENT)
Age: 75
End: 2025-02-07

## 2025-02-22 NOTE — SEPSIS NOTE - COMMENTS
Speech Therapy    Patient not seen in therapy.    Unavailable due to sleeping soundly.      Re-attempt plan: tomorrow    Additional details:  Attempted to see pt for swallow eval, however, pt asleep upon entering and would not rouse to verbal or tactile stimuli. Per RN, pt has been accepting pills by mouth with thin liquids without incident. ST to re-attempt tomorrow or diet per MD.      OBJECTIVE                          Therapy procedure time and total treatment time can be found documented on the Time Entry flowsheet   will be placed on home CPAP HS

## 2025-02-26 ENCOUNTER — APPOINTMENT (OUTPATIENT)
Dept: ENDOCRINOLOGY | Facility: CLINIC | Age: 75
End: 2025-02-26
Payer: MEDICARE

## 2025-02-26 VITALS
WEIGHT: 315 LBS | HEART RATE: 94 BPM | OXYGEN SATURATION: 97 % | BODY MASS INDEX: 45.1 KG/M2 | SYSTOLIC BLOOD PRESSURE: 112 MMHG | HEIGHT: 70 IN | DIASTOLIC BLOOD PRESSURE: 68 MMHG

## 2025-02-26 DIAGNOSIS — E11.65 TYPE 2 DIABETES MELLITUS WITH HYPERGLYCEMIA: ICD-10-CM

## 2025-02-26 DIAGNOSIS — E89.0 POSTPROCEDURAL HYPOTHYROIDISM: ICD-10-CM

## 2025-02-26 DIAGNOSIS — E04.2 NONTOXIC MULTINODULAR GOITER: ICD-10-CM

## 2025-02-26 DIAGNOSIS — E11.40 TYPE 2 DIABETES MELLITUS WITH DIABETIC NEUROPATHY, UNSPECIFIED: ICD-10-CM

## 2025-02-26 DIAGNOSIS — E78.5 HYPERLIPIDEMIA, UNSPECIFIED: ICD-10-CM

## 2025-02-26 PROCEDURE — 95251 CONT GLUC MNTR ANALYSIS I&R: CPT

## 2025-02-26 PROCEDURE — G2211 COMPLEX E/M VISIT ADD ON: CPT

## 2025-02-26 PROCEDURE — 99214 OFFICE O/P EST MOD 30 MIN: CPT

## 2025-04-03 ENCOUNTER — NON-APPOINTMENT (OUTPATIENT)
Age: 75
End: 2025-04-03

## 2025-05-07 ENCOUNTER — RX RENEWAL (OUTPATIENT)
Age: 75
End: 2025-05-07

## 2025-05-07 RX ORDER — PEN NEEDLE, DIABETIC 29 G X1/2"
31G X 5 MM NEEDLE, DISPOSABLE MISCELLANEOUS
Qty: 180 | Refills: 3 | Status: ACTIVE | COMMUNITY
Start: 2025-05-07 | End: 1900-01-01

## 2025-08-01 ENCOUNTER — RX RENEWAL (OUTPATIENT)
Age: 75
End: 2025-08-01

## 2025-09-16 ENCOUNTER — RX RENEWAL (OUTPATIENT)
Age: 75
End: 2025-09-16

## 2025-09-19 ENCOUNTER — NON-APPOINTMENT (OUTPATIENT)
Age: 75
End: 2025-09-19

## 2025-09-19 DIAGNOSIS — E11.21 TYPE 2 DIABETES MELLITUS WITH DIABETIC NEPHROPATHY: ICD-10-CM
